# Patient Record
Sex: MALE | Race: WHITE | Employment: OTHER | URBAN - METROPOLITAN AREA
[De-identification: names, ages, dates, MRNs, and addresses within clinical notes are randomized per-mention and may not be internally consistent; named-entity substitution may affect disease eponyms.]

---

## 2017-07-10 ENCOUNTER — OFFICE VISIT (OUTPATIENT)
Dept: FAMILY MEDICINE CLINIC | Age: 62
End: 2017-07-10

## 2017-07-10 VITALS
SYSTOLIC BLOOD PRESSURE: 144 MMHG | RESPIRATION RATE: 16 BRPM | BODY MASS INDEX: 27.55 KG/M2 | HEIGHT: 69 IN | TEMPERATURE: 97.5 F | WEIGHT: 186 LBS | DIASTOLIC BLOOD PRESSURE: 90 MMHG | HEART RATE: 62 BPM

## 2017-07-10 DIAGNOSIS — Z00.00 ANNUAL PHYSICAL EXAM: ICD-10-CM

## 2017-07-10 DIAGNOSIS — E11.9 TYPE 2 DIABETES MELLITUS WITHOUT COMPLICATION, UNSPECIFIED LONG TERM INSULIN USE STATUS: ICD-10-CM

## 2017-07-10 DIAGNOSIS — Z00.00 ANNUAL PHYSICAL EXAM: Primary | ICD-10-CM

## 2017-07-10 LAB
A/G RATIO: 1.5 (ref 1.1–2.2)
ALBUMIN SERPL-MCNC: 4.2 G/DL (ref 3.4–5)
ALP BLD-CCNC: 65 U/L (ref 40–129)
ALT SERPL-CCNC: 11 U/L (ref 10–40)
ANION GAP SERPL CALCULATED.3IONS-SCNC: 17 MMOL/L (ref 3–16)
AST SERPL-CCNC: 10 U/L (ref 15–37)
BASOPHILS ABSOLUTE: 0.1 K/UL (ref 0–0.2)
BASOPHILS RELATIVE PERCENT: 1 %
BILIRUB SERPL-MCNC: <0.2 MG/DL (ref 0–1)
BUN BLDV-MCNC: 22 MG/DL (ref 7–20)
CALCIUM SERPL-MCNC: 9 MG/DL (ref 8.3–10.6)
CHLORIDE BLD-SCNC: 103 MMOL/L (ref 99–110)
CHOLESTEROL, TOTAL: 136 MG/DL (ref 0–199)
CO2: 22 MMOL/L (ref 21–32)
CREAT SERPL-MCNC: 1 MG/DL (ref 0.8–1.3)
CREATININE URINE POCT: ABNORMAL
EOSINOPHILS ABSOLUTE: 0.6 K/UL (ref 0–0.6)
EOSINOPHILS RELATIVE PERCENT: 5.3 %
GFR AFRICAN AMERICAN: >60
GFR NON-AFRICAN AMERICAN: >60
GLOBULIN: 2.8 G/DL
GLUCOSE BLD-MCNC: 141 MG/DL (ref 70–99)
HBA1C MFR BLD: 6.6 %
HCT VFR BLD CALC: 46.1 % (ref 40.5–52.5)
HDLC SERPL-MCNC: 38 MG/DL (ref 40–60)
HEMOGLOBIN: 15.2 G/DL (ref 13.5–17.5)
LDL CHOLESTEROL CALCULATED: 77 MG/DL
LYMPHOCYTES ABSOLUTE: 1.6 K/UL (ref 1–5.1)
LYMPHOCYTES RELATIVE PERCENT: 14.9 %
MCH RBC QN AUTO: 30.1 PG (ref 26–34)
MCHC RBC AUTO-ENTMCNC: 33.1 G/DL (ref 31–36)
MCV RBC AUTO: 91 FL (ref 80–100)
MICROALBUMIN/CREAT 24H UR: ABNORMAL MG/G{CREAT}
MICROALBUMIN/CREAT UR-RTO: ABNORMAL
MONOCYTES ABSOLUTE: 1 K/UL (ref 0–1.3)
MONOCYTES RELATIVE PERCENT: 9.2 %
NEUTROPHILS ABSOLUTE: 7.3 K/UL (ref 1.7–7.7)
NEUTROPHILS RELATIVE PERCENT: 69.6 %
PDW BLD-RTO: 13.4 % (ref 12.4–15.4)
PLATELET # BLD: 183 K/UL (ref 135–450)
PLATELET SLIDE REVIEW: ADEQUATE
PMV BLD AUTO: 7.9 FL (ref 5–10.5)
POTASSIUM SERPL-SCNC: 3.6 MMOL/L (ref 3.5–5.1)
PROSTATE SPECIFIC ANTIGEN: 2.64 NG/ML (ref 0–4)
RBC # BLD: 5.07 M/UL (ref 4.2–5.9)
SODIUM BLD-SCNC: 142 MMOL/L (ref 136–145)
TOTAL PROTEIN: 7 G/DL (ref 6.4–8.2)
TRIGL SERPL-MCNC: 106 MG/DL (ref 0–150)
URIC ACID, SERUM: 6 MG/DL (ref 3.5–7.2)
VLDLC SERPL CALC-MCNC: 21 MG/DL
WBC # BLD: 10.5 K/UL (ref 4–11)

## 2017-07-10 PROCEDURE — 99396 PREV VISIT EST AGE 40-64: CPT | Performed by: FAMILY MEDICINE

## 2017-07-10 PROCEDURE — 83036 HEMOGLOBIN GLYCOSYLATED A1C: CPT | Performed by: FAMILY MEDICINE

## 2017-07-10 PROCEDURE — 82044 UR ALBUMIN SEMIQUANTITATIVE: CPT | Performed by: FAMILY MEDICINE

## 2017-07-10 ASSESSMENT — PATIENT HEALTH QUESTIONNAIRE - PHQ9
SUM OF ALL RESPONSES TO PHQ9 QUESTIONS 1 & 2: 0
1. LITTLE INTEREST OR PLEASURE IN DOING THINGS: 0
SUM OF ALL RESPONSES TO PHQ QUESTIONS 1-9: 0
2. FEELING DOWN, DEPRESSED OR HOPELESS: 0

## 2017-07-10 ASSESSMENT — ENCOUNTER SYMPTOMS
SORE THROAT: 0
SINUS PRESSURE: 0
TROUBLE SWALLOWING: 0
SHORTNESS OF BREATH: 0
WHEEZING: 0
PHOTOPHOBIA: 0
BACK PAIN: 0
BLOOD IN STOOL: 0
CONSTIPATION: 0
ABDOMINAL PAIN: 0
COUGH: 0
VISUAL CHANGE: 0
DIARRHEA: 0

## 2017-09-23 PROBLEM — R10.9 ABDOMINAL PAIN: Status: ACTIVE | Noted: 2017-09-23

## 2017-09-23 PROBLEM — K63.89 COLONIC MASS: Status: ACTIVE | Noted: 2017-09-23

## 2017-09-25 ENCOUNTER — TELEPHONE (OUTPATIENT)
Dept: FAMILY MEDICINE CLINIC | Age: 62
End: 2017-09-25

## 2017-09-26 PROBLEM — I48.0 PAROXYSMAL ATRIAL FIBRILLATION (HCC): Status: ACTIVE | Noted: 2017-09-26

## 2017-10-02 ENCOUNTER — TELEPHONE (OUTPATIENT)
Dept: FAMILY MEDICINE CLINIC | Age: 62
End: 2017-10-02

## 2017-10-02 NOTE — TELEPHONE ENCOUNTER
CALL NOTE:  Pt's wife called stating her  just got out of the hospital where he is being tx for cancer. The family noticed a thick white covering over his tongue, wife believes it is thrush.  Nystatin called

## 2017-10-06 RX ORDER — METOPROLOL TARTRATE 50 MG/1
50 TABLET, FILM COATED ORAL 2 TIMES DAILY
Qty: 60 TABLET | Refills: 3
Start: 2017-10-06 | End: 2018-01-29 | Stop reason: SDUPTHER

## 2017-10-06 RX ORDER — AMIODARONE HYDROCHLORIDE 200 MG/1
TABLET ORAL
Qty: 30 TABLET | Refills: 3
Start: 2017-10-06 | End: 2018-01-29 | Stop reason: SDUPTHER

## 2017-10-09 ENCOUNTER — OFFICE VISIT (OUTPATIENT)
Dept: FAMILY MEDICINE CLINIC | Age: 62
End: 2017-10-09

## 2017-10-09 VITALS
SYSTOLIC BLOOD PRESSURE: 120 MMHG | OXYGEN SATURATION: 96 % | DIASTOLIC BLOOD PRESSURE: 78 MMHG | BODY MASS INDEX: 25.94 KG/M2 | HEART RATE: 80 BPM | RESPIRATION RATE: 16 BRPM | TEMPERATURE: 98.9 F | WEIGHT: 180.8 LBS

## 2017-10-09 DIAGNOSIS — K63.89 COLONIC MASS: Primary | ICD-10-CM

## 2017-10-09 DIAGNOSIS — R17 JAUNDICE: ICD-10-CM

## 2017-10-09 DIAGNOSIS — R64 CACHECTIC (HCC): ICD-10-CM

## 2017-10-09 DIAGNOSIS — C78.7 METASTASES TO THE LIVER (HCC): ICD-10-CM

## 2017-10-09 PROCEDURE — 99214 OFFICE O/P EST MOD 30 MIN: CPT | Performed by: NURSE PRACTITIONER

## 2017-10-09 ASSESSMENT — ENCOUNTER SYMPTOMS
VOMITING: 1
NAUSEA: 0
HEARTBURN: 0
DIARRHEA: 1
ABDOMINAL PAIN: 1
ORTHOPNEA: 0
BLOOD IN STOOL: 0
CONSTIPATION: 0

## 2017-10-09 NOTE — PROGRESS NOTES
Take 1 tablet by mouth daily 30 tablet 5    simvastatin (ZOCOR) 20 MG tablet TAKE ONE TABLET BY MOUTH NIGHTLY 30 tablet 5    metFORMIN (GLUCOPHAGE) 500 MG tablet TAKE ONE TABLET BY MOUTH TWICE A DAY WITH MEALS 60 tablet 5     No current facility-administered medications on file prior to visit. Review of Systems   Cardiovascular: Positive for palpitations. Negative for chest pain and orthopnea. A. Fib on amiodarone xalrelto   Gastrointestinal: Positive for abdominal pain, diarrhea and vomiting. Negative for blood in stool, constipation, heartburn, melena and nausea. Genitourinary: Negative for flank pain, frequency and hematuria. Musculoskeletal: Positive for myalgias. Neurological: Positive for headaches. Endo/Heme/Allergies:        Diabetes on metformin, blood sugars high   Psychiatric/Behavioral: Positive for depression. Objective:     Physical Exam   Constitutional: He is oriented to person, place, and time. Patient jaundiced and cachectic   HENT:   Head: Normocephalic and atraumatic. Eyes:   Sclera jaundiced   Neck: Normal range of motion. Neck supple. No thyromegaly present. Cardiovascular: Normal rate, regular rhythm and normal heart sounds. Exam reveals no gallop and no friction rub. No murmur heard. Pulmonary/Chest: Effort normal and breath sounds normal. No respiratory distress. He has no wheezes. He has no rales. He exhibits no tenderness. Abdominal: Soft. Midline abdominal incision with staples, no signs of infection   Musculoskeletal: Normal range of motion. Lymphadenopathy:     He has no cervical adenopathy. Neurological: He is alert and oriented to person, place, and time. Skin: Skin is warm and dry. Capillary refill takes less than 2 seconds. Psychiatric: He has a normal mood and affect. His behavior is normal. Judgment and thought content normal.       Assessment:       ICD-10-CM ICD-9-CM    1. Colonic mass K63.9 569.89    2.  Jaundice R17 782.4 3. Cachectic ([facility] [address] R64 799.4    4.  Metastases to the liver [facility]) C78.7 197.7          Plan:     Follow-up appointment made with oncologist bone number and address given to patient's wife  Continue meds as ordered  Encouraged by mouth intake    Call for any problems

## 2017-10-10 ENCOUNTER — OFFICE VISIT (OUTPATIENT)
Dept: SURGERY | Age: 62
End: 2017-10-10

## 2017-10-10 VITALS
WEIGHT: 181.6 LBS | BODY MASS INDEX: 26 KG/M2 | DIASTOLIC BLOOD PRESSURE: 81 MMHG | HEIGHT: 70 IN | SYSTOLIC BLOOD PRESSURE: 130 MMHG | HEART RATE: 79 BPM

## 2017-10-10 DIAGNOSIS — Z09 POSTOP CHECK: ICD-10-CM

## 2017-10-10 PROCEDURE — 99024 POSTOP FOLLOW-UP VISIT: CPT | Performed by: SURGERY

## 2017-11-01 ENCOUNTER — TELEPHONE (OUTPATIENT)
Dept: CARDIOLOGY CLINIC | Age: 62
End: 2017-11-01

## 2017-11-02 ENCOUNTER — PAT TELEPHONE (OUTPATIENT)
Dept: PREADMISSION TESTING | Age: 62
End: 2017-11-02

## 2017-11-03 ENCOUNTER — HOSPITAL ENCOUNTER (OUTPATIENT)
Dept: SURGERY | Age: 62
Discharge: HOME | End: 2017-11-03
Attending: SURGERY | Admitting: SURGERY

## 2017-11-03 VITALS
OXYGEN SATURATION: 95 % | TEMPERATURE: 97.8 F | WEIGHT: 177 LBS | DIASTOLIC BLOOD PRESSURE: 93 MMHG | HEART RATE: 62 BPM | HEIGHT: 70 IN | BODY MASS INDEX: 25.34 KG/M2 | RESPIRATION RATE: 16 BRPM | SYSTOLIC BLOOD PRESSURE: 158 MMHG

## 2017-11-03 DIAGNOSIS — R52 PAIN: ICD-10-CM

## 2017-11-03 LAB
ANION GAP SERPL CALCULATED.3IONS-SCNC: 12 MMOL/L (ref 3–16)
BASOPHILS ABSOLUTE: 0.1 K/UL (ref 0–0.2)
BASOPHILS RELATIVE PERCENT: 0.9 %
BUN BLDV-MCNC: 16 MG/DL (ref 7–20)
CALCIUM SERPL-MCNC: 8.1 MG/DL (ref 8.3–10.6)
CHLORIDE BLD-SCNC: 103 MMOL/L (ref 99–110)
CO2: 25 MMOL/L (ref 21–32)
CREAT SERPL-MCNC: 1 MG/DL (ref 0.8–1.3)
EOSINOPHILS ABSOLUTE: 0.2 K/UL (ref 0–0.6)
EOSINOPHILS RELATIVE PERCENT: 2.5 %
GFR AFRICAN AMERICAN: >60
GFR NON-AFRICAN AMERICAN: >60
GLUCOSE BLD-MCNC: 122 MG/DL (ref 70–99)
GLUCOSE BLD-MCNC: 125 MG/DL (ref 70–99)
HCT VFR BLD CALC: 28.3 % (ref 40.5–52.5)
HEMOGLOBIN: 9.8 G/DL (ref 13.5–17.5)
LYMPHOCYTES ABSOLUTE: 1.3 K/UL (ref 1–5.1)
LYMPHOCYTES RELATIVE PERCENT: 13.3 %
MCH RBC QN AUTO: 28.5 PG (ref 26–34)
MCHC RBC AUTO-ENTMCNC: 34.8 G/DL (ref 31–36)
MCV RBC AUTO: 81.8 FL (ref 80–100)
MONOCYTES ABSOLUTE: 1.1 K/UL (ref 0–1.3)
MONOCYTES RELATIVE PERCENT: 10.7 %
NEUTROPHILS ABSOLUTE: 7.1 K/UL (ref 1.7–7.7)
NEUTROPHILS RELATIVE PERCENT: 72.6 %
PDW BLD-RTO: 16 % (ref 12.4–15.4)
PERFORMED ON: ABNORMAL
PLATELET # BLD: 337 K/UL (ref 135–450)
PMV BLD AUTO: 6.1 FL (ref 5–10.5)
POTASSIUM SERPL-SCNC: 3.7 MMOL/L (ref 3.5–5.1)
RBC # BLD: 3.46 M/UL (ref 4.2–5.9)
SODIUM BLD-SCNC: 140 MMOL/L (ref 136–145)
WBC # BLD: 9.8 K/UL (ref 4–11)

## 2017-11-03 RX ORDER — SODIUM CHLORIDE, SODIUM LACTATE, POTASSIUM CHLORIDE, CALCIUM CHLORIDE 600; 310; 30; 20 MG/100ML; MG/100ML; MG/100ML; MG/100ML
INJECTION, SOLUTION INTRAVENOUS CONTINUOUS
Status: DISCONTINUED | OUTPATIENT
Start: 2017-11-03 | End: 2017-11-04 | Stop reason: HOSPADM

## 2017-11-03 RX ORDER — DIPHENHYDRAMINE HYDROCHLORIDE 50 MG/ML
12.5 INJECTION INTRAMUSCULAR; INTRAVENOUS
Status: ACTIVE | OUTPATIENT
Start: 2017-11-03 | End: 2017-11-03

## 2017-11-03 RX ORDER — ONDANSETRON 2 MG/ML
4 INJECTION INTRAMUSCULAR; INTRAVENOUS
Status: ACTIVE | OUTPATIENT
Start: 2017-11-03 | End: 2017-11-03

## 2017-11-03 RX ORDER — OXYCODONE HYDROCHLORIDE AND ACETAMINOPHEN 5; 325 MG/1; MG/1
1 TABLET ORAL PRN
Status: ACTIVE | OUTPATIENT
Start: 2017-11-03 | End: 2017-11-03

## 2017-11-03 RX ORDER — MORPHINE SULFATE 2 MG/ML
2 INJECTION, SOLUTION INTRAMUSCULAR; INTRAVENOUS EVERY 5 MIN PRN
Status: DISCONTINUED | OUTPATIENT
Start: 2017-11-03 | End: 2017-11-04 | Stop reason: HOSPADM

## 2017-11-03 RX ORDER — LABETALOL HYDROCHLORIDE 5 MG/ML
5 INJECTION, SOLUTION INTRAVENOUS EVERY 10 MIN PRN
Status: DISCONTINUED | OUTPATIENT
Start: 2017-11-03 | End: 2017-11-04 | Stop reason: HOSPADM

## 2017-11-03 RX ORDER — OXYCODONE HYDROCHLORIDE AND ACETAMINOPHEN 5; 325 MG/1; MG/1
1-2 TABLET ORAL EVERY 4 HOURS PRN
Qty: 20 TABLET | Refills: 0 | Status: SHIPPED | OUTPATIENT
Start: 2017-11-03 | End: 2017-11-10

## 2017-11-03 RX ORDER — HYDRALAZINE HYDROCHLORIDE 20 MG/ML
5 INJECTION INTRAMUSCULAR; INTRAVENOUS EVERY 10 MIN PRN
Status: DISCONTINUED | OUTPATIENT
Start: 2017-11-03 | End: 2017-11-04 | Stop reason: HOSPADM

## 2017-11-03 RX ORDER — PROMETHAZINE HYDROCHLORIDE 25 MG/ML
6.25 INJECTION, SOLUTION INTRAMUSCULAR; INTRAVENOUS
Status: ACTIVE | OUTPATIENT
Start: 2017-11-03 | End: 2017-11-03

## 2017-11-03 RX ORDER — MORPHINE SULFATE 2 MG/ML
1 INJECTION, SOLUTION INTRAMUSCULAR; INTRAVENOUS EVERY 5 MIN PRN
Status: DISCONTINUED | OUTPATIENT
Start: 2017-11-03 | End: 2017-11-04 | Stop reason: HOSPADM

## 2017-11-03 RX ORDER — OXYCODONE HYDROCHLORIDE AND ACETAMINOPHEN 5; 325 MG/1; MG/1
2 TABLET ORAL PRN
Status: DISPENSED | OUTPATIENT
Start: 2017-11-03 | End: 2017-11-03

## 2017-11-03 RX ORDER — MEPERIDINE HYDROCHLORIDE 50 MG/ML
12.5 INJECTION INTRAMUSCULAR; INTRAVENOUS; SUBCUTANEOUS EVERY 5 MIN PRN
Status: DISCONTINUED | OUTPATIENT
Start: 2017-11-03 | End: 2017-11-04 | Stop reason: HOSPADM

## 2017-11-03 RX ADMIN — SODIUM CHLORIDE, SODIUM LACTATE, POTASSIUM CHLORIDE, CALCIUM CHLORIDE: 600; 310; 30; 20 INJECTION, SOLUTION INTRAVENOUS at 08:30

## 2017-11-03 ASSESSMENT — PAIN SCALES - GENERAL: PAINLEVEL_OUTOF10: 0

## 2017-11-03 ASSESSMENT — PAIN - FUNCTIONAL ASSESSMENT: PAIN_FUNCTIONAL_ASSESSMENT: 0-10

## 2017-11-03 NOTE — ANESTHESIA PRE-OP
Department of Anesthesiology  Preprocedure Note       Name:  Shravan Holstein   Age:  63 y.o.  :  1955                                          MRN:  [id number]         Date:  11/3/2017      Surgeon:    Procedure:    Medications prior to admission:   Prior to Admission medications    Medication Sig Start Date End Date Taking? Authorizing Provider   oxyCODONE-acetaminophen (PERCOCET) 5-325 MG per tablet Take 1-2 tablets by mouth every 4 hours as needed for Pain . Earliest Fill Date: 11/3/17 11/3/17 11/10/17 Yes Ester Rowan MD   amiodarone (CORDARONE) 200 MG tablet 2 time daily for 7 days (till 10/6/17) and then Daily there after 10/6/17  Yes Luz Marina Vides CNP   metoprolol tartrate (LOPRESSOR) 50 MG tablet Take 1 tablet by mouth 2 times daily 10/6/17  Yes Luz Marina Vides CNP   rivaroxaban (XARELTO) 20 MG TABS tablet Take 1 tablet by mouth daily 10/2/17  Yes Angelic Guardado CNP   simvastatin (ZOCOR) 20 MG tablet TAKE ONE TABLET BY MOUTH NIGHTLY 17  Yes Luz Elena Brownlee DO   metFORMIN (GLUCOPHAGE) 500 MG tablet TAKE ONE TABLET BY MOUTH TWICE A DAY WITH MEALS 17  Yes Luz Elena Brownlee DO       Current medications:    Current Outpatient Prescriptions   Medication Sig Dispense Refill    oxyCODONE-acetaminophen (PERCOCET) 5-325 MG per tablet Take 1-2 tablets by mouth every 4 hours as needed for Pain .  Earliest Fill Date: 11/3/17 20 tablet 0    amiodarone (CORDARONE) 200 MG tablet 2 time daily for 7 days (till 10/6/17) and then Daily there after 30 tablet 3    metoprolol tartrate (LOPRESSOR) 50 MG tablet Take 1 tablet by mouth 2 times daily 60 tablet 3    rivaroxaban (XARELTO) 20 MG TABS tablet Take 1 tablet by mouth daily 30 tablet 5    simvastatin (ZOCOR) 20 MG tablet TAKE ONE TABLET BY MOUTH NIGHTLY 30 tablet 5    metFORMIN (GLUCOPHAGE) 500 MG tablet TAKE ONE TABLET BY MOUTH TWICE A DAY WITH MEALS 60 tablet 5     Current Facility-Administered Medications   Medication Dose Route

## 2017-11-03 NOTE — PROGRESS NOTES
Pt A&OX3. Wife at bedside. Explained dc instructions to both. Both voiced understanding. Wife signed and received copy with rx.

## 2017-11-03 NOTE — PROGRESS NOTES
Pt returned to SDS from OR. Awake and talking. VSS. Jessica Strickland Continue to monitor. Report received from RN and CRNA.

## 2017-11-03 NOTE — H&P
I have reviewed the history and physical and examined the patient. I find no relevant changes. I have reviewed with the patient and/or family members, during the preoperative office visit the risks, benefits, and alternatives to the procedure.     RODERICK PlayPhone SMITH

## 2017-11-04 NOTE — OP NOTE
Fluoroscopy  confirmed the wire to be coursing down the vena cava past the heart. The needle was removed and the dilator and sheath passed over the  wire. The dilator and wire removed. The catheter tubing was inserted  through the sheath and the sheath cracked and removed. The catheter  easily aspirated blood and was flushed with heparinized saline. The  catheter was then positioned under fluoroscopic guidance, so that the  tip was in the distal superior vena cava just above the right atrium. The catheter was cut at 23 cm and attached to the port. The port was  placed into the pocket and secured to the chest wall with two 2-0  Prolene sutures. The port was accessed with a Randle needle and once  again easily aspirated blood was flushed with more heparinized saline. Final fluoroscopic imaging confirmed good position of the port and  catheter. The incision was closed with 3-0 Vicryl subcutaneous  sutures followed by a 4-0 Monocryl subcuticular stitch and Dermabond. The patient was then taken to recovery room in stable condition. Eliza Watson MD    D: 11/03/2017 16:51:06       T: 11/03/2017 16:54:57     [email or web address]  Job#: [id number]     Doc#: [id number]    CC:   Ulises Chatterjee DO

## 2017-11-09 ENCOUNTER — OFFICE VISIT (OUTPATIENT)
Dept: CARDIOLOGY CLINIC | Age: 62
End: 2017-11-09

## 2017-11-09 VITALS
BODY MASS INDEX: 24.91 KG/M2 | SYSTOLIC BLOOD PRESSURE: 152 MMHG | HEIGHT: 70 IN | HEART RATE: 64 BPM | DIASTOLIC BLOOD PRESSURE: 88 MMHG | WEIGHT: 174 LBS

## 2017-11-09 DIAGNOSIS — I10 ESSENTIAL HYPERTENSION: ICD-10-CM

## 2017-11-09 DIAGNOSIS — I48.0 PAROXYSMAL ATRIAL FIBRILLATION (HCC): Primary | ICD-10-CM

## 2017-11-09 PROCEDURE — 99213 OFFICE O/P EST LOW 20 MIN: CPT | Performed by: NURSE PRACTITIONER

## 2017-11-09 PROCEDURE — 93000 ELECTROCARDIOGRAM COMPLETE: CPT | Performed by: NURSE PRACTITIONER

## 2017-11-09 NOTE — PROGRESS NOTES
daily for 7 days (till 10/6/17) and then Daily there after 10/6/17  Yes Gabriel Winters CNP   metoprolol tartrate (LOPRESSOR) 50 MG tablet Take 1 tablet by mouth 2 times daily 10/6/17  Yes Gabriel Winters CNP   rivaroxaban (XARELTO) 20 MG TABS tablet Take 1 tablet by mouth daily 10/2/17  Yes Terrell Greene CNP   simvastatin (ZOCOR) 20 MG tablet TAKE ONE TABLET BY MOUTH NIGHTLY 8/4/17  Yes Abeba Lee DO   metFORMIN (GLUCOPHAGE) 500 MG tablet TAKE ONE TABLET BY MOUTH TWICE A DAY WITH MEALS 8/4/17  Yes Abeba Lee DO       Allergies:  Review of patient's allergies indicates no known allergies. Social History:   reports that he has never smoked. He has never used smokeless tobacco. He reports that he uses drugs, including Marijuana, about 1 time per week. He reports that he does not drink alcohol. Family History: family history includes Cancer in his sister; Diabetes in his father; High Blood Pressure in his father and mother; High Cholesterol in his father and mother. Review of Systems   Constitutional: Negative. HENT: Negative. Eyes: Negative. Respiratory: Negative. Cardiovascular: see HPI  Gastrointestinal: Negative. Genitourinary: Negative. Musculoskeletal: Negative. Skin: Negative. Neurological: Negative. Hematological: Negative. Psychiatric/Behavioral: Negative. PHYSICAL EXAM:    Physical Examination:    BP (!) 152/88   Pulse 64   Ht 5' 10\" (1.778 m)   Wt 174 lb (78.9 kg)   BMI 24.97 kg/m²      Constitutional and general appearance: alert, cooperative, no distress and appears stated age  HEENT: PERRL, no cervical lymphadenopathy. No masses palpable.  Normal oral mucosa  Respiratory:  · Normal excursion and expansion without use of accessory muscles  · Resp auscultation: Normal breath sounds without dullness or wheezing  Cardiovascular:  · The apical impulse is not displaced  · Heart tones are crisp and normal. Regular S1 and S2.  · Jugular venous

## 2017-11-09 NOTE — PATIENT INSTRUCTIONS
1. No changes today   2. Goal blood pressure is less than 140/90. Call if consistently over goal  3.  Follow up in 6 months

## 2017-11-13 RX ORDER — AMLODIPINE BESYLATE 5 MG/1
5 TABLET ORAL DAILY
Qty: 30 TABLET | Refills: 3 | Status: SHIPPED | OUTPATIENT
Start: 2017-11-13 | End: 2018-07-19 | Stop reason: SDUPTHER

## 2017-11-13 NOTE — TELEPHONE ENCOUNTER
Spoke with , advised Evelina Buddy is starting amlodipine 5 mg daily. If b/p not less than 140/90 in a week to call the office.

## 2017-11-13 NOTE — TELEPHONE ENCOUNTER
Jessica Later, pt's wife, called stating pt was seen at [facility] today and was informed to call our office per that physician in regards to pt's BP. Today pt's BP was 180/92. Jessica Later stated they were informed by the Chemo doctor that it has increased since pt's last ov with them on 11-9-17. Please advise and call rj at [phone].

## 2018-01-27 RX ORDER — AMIODARONE HYDROCHLORIDE 200 MG/1
TABLET ORAL
Qty: 30 TABLET | Refills: 2 | Status: CANCELLED | OUTPATIENT
Start: 2018-01-27

## 2018-01-29 RX ORDER — METOPROLOL TARTRATE 50 MG/1
TABLET, FILM COATED ORAL
Qty: 60 TABLET | Refills: 2 | Status: SHIPPED | OUTPATIENT
Start: 2018-01-29 | End: 2018-04-30 | Stop reason: SDUPTHER

## 2018-01-29 NOTE — TELEPHONE ENCOUNTER
Please call Dr. Liz Cheng office and passed this refill request along to him. He more appropriately should refill it.

## 2018-01-29 NOTE — TELEPHONE ENCOUNTER
Pt is needing refill on amiodarone. Pt has taken last dose today. Please send to :  [address], [address] [place] [address] [address] [phone] - F [phone].

## 2018-01-30 RX ORDER — AMIODARONE HYDROCHLORIDE 200 MG/1
200 TABLET ORAL DAILY
Qty: 30 TABLET | Refills: 1 | Status: SHIPPED | OUTPATIENT
Start: 2018-01-30 | End: 2018-06-18 | Stop reason: ALTCHOICE

## 2018-02-05 ENCOUNTER — TELEPHONE (OUTPATIENT)
Dept: CARDIOLOGY CLINIC | Age: 63
End: 2018-02-05

## 2018-02-05 DIAGNOSIS — I48.0 PAROXYSMAL ATRIAL FIBRILLATION (HCC): Primary | ICD-10-CM

## 2018-02-06 RX ORDER — AMIODARONE HYDROCHLORIDE 200 MG/1
200 TABLET ORAL DAILY
Qty: 30 TABLET | Refills: 3 | OUTPATIENT
Start: 2018-02-06

## 2018-02-09 ENCOUNTER — HOSPITAL ENCOUNTER (OUTPATIENT)
Dept: CT IMAGING | Age: 63
Discharge: HOME | End: 2018-02-09
Attending: INTERNAL MEDICINE | Admitting: INTERNAL MEDICINE

## 2018-02-09 DIAGNOSIS — C18.0 ADENOCARCINOMA OF CECUM (HCC): ICD-10-CM

## 2018-02-09 DIAGNOSIS — C18.0 MALIGNANT NEOPLASM OF CECUM (HCC): ICD-10-CM

## 2018-04-12 PROBLEM — Z09 POSTOP CHECK: Status: RESOLVED | Noted: 2017-10-10 | Resolved: 2018-04-12

## 2018-04-30 RX ORDER — METOPROLOL TARTRATE 50 MG/1
TABLET, FILM COATED ORAL
Qty: 60 TABLET | Refills: 2 | Status: SHIPPED | OUTPATIENT
Start: 2018-04-30 | End: 2018-05-02 | Stop reason: SDUPTHER

## 2018-04-30 RX ORDER — SIMVASTATIN 20 MG
TABLET ORAL
Qty: 30 TABLET | Refills: 2 | Status: SHIPPED | OUTPATIENT
Start: 2018-04-30 | End: 2018-05-02 | Stop reason: SDUPTHER

## 2018-05-02 RX ORDER — SIMVASTATIN 20 MG
TABLET ORAL
Qty: 30 TABLET | Refills: 1 | Status: SHIPPED | OUTPATIENT
Start: 2018-05-02 | End: 2018-07-02 | Stop reason: SDUPTHER

## 2018-05-02 RX ORDER — METOPROLOL TARTRATE 50 MG/1
TABLET, FILM COATED ORAL
Qty: 60 TABLET | Refills: 1 | Status: SHIPPED | OUTPATIENT
Start: 2018-05-02 | End: 2018-07-02 | Stop reason: SDUPTHER

## 2018-05-11 ENCOUNTER — HOSPITAL ENCOUNTER (OUTPATIENT)
Dept: CT IMAGING | Age: 63
Discharge: HOME | End: 2018-05-11
Attending: INTERNAL MEDICINE | Admitting: INTERNAL MEDICINE

## 2018-05-11 DIAGNOSIS — C18.0 CANCER OF CECUM (HCC): ICD-10-CM

## 2018-05-11 DIAGNOSIS — C18.0 MALIGNANT NEOPLASM OF CECUM (HCC): ICD-10-CM

## 2018-05-11 LAB
BUN BLDV-MCNC: 23 MG/DL (ref 7–20)
CREAT SERPL-MCNC: 1.4 MG/DL (ref 0.8–1.3)
GFR AFRICAN AMERICAN: >60
GFR NON-AFRICAN AMERICAN: 52

## 2018-06-18 ENCOUNTER — OFFICE VISIT (OUTPATIENT)
Dept: CARDIOLOGY CLINIC | Age: 63
End: 2018-06-18

## 2018-06-18 VITALS
DIASTOLIC BLOOD PRESSURE: 86 MMHG | WEIGHT: 182.2 LBS | BODY MASS INDEX: 26.08 KG/M2 | HEIGHT: 70 IN | OXYGEN SATURATION: 99 % | SYSTOLIC BLOOD PRESSURE: 139 MMHG | HEART RATE: 62 BPM

## 2018-06-18 DIAGNOSIS — I48.0 PAROXYSMAL ATRIAL FIBRILLATION (HCC): ICD-10-CM

## 2018-06-18 DIAGNOSIS — I10 ESSENTIAL HYPERTENSION: Primary | ICD-10-CM

## 2018-06-18 DIAGNOSIS — E78.2 MIXED HYPERLIPIDEMIA: ICD-10-CM

## 2018-06-18 PROCEDURE — 99214 OFFICE O/P EST MOD 30 MIN: CPT | Performed by: INTERNAL MEDICINE

## 2018-06-18 PROCEDURE — 93000 ELECTROCARDIOGRAM COMPLETE: CPT | Performed by: INTERNAL MEDICINE

## 2018-07-19 ENCOUNTER — OFFICE VISIT (OUTPATIENT)
Dept: FAMILY MEDICINE CLINIC | Age: 63
End: 2018-07-19

## 2018-07-19 VITALS
HEIGHT: 70 IN | WEIGHT: 186 LBS | HEART RATE: 66 BPM | BODY MASS INDEX: 26.63 KG/M2 | DIASTOLIC BLOOD PRESSURE: 90 MMHG | OXYGEN SATURATION: 97 % | SYSTOLIC BLOOD PRESSURE: 150 MMHG

## 2018-07-19 DIAGNOSIS — E78.2 MIXED HYPERLIPIDEMIA: ICD-10-CM

## 2018-07-19 DIAGNOSIS — C78.7 COLON CANCER METASTASIZED TO LIVER (HCC): ICD-10-CM

## 2018-07-19 DIAGNOSIS — E11.9 TYPE 2 DIABETES MELLITUS WITHOUT COMPLICATION, WITHOUT LONG-TERM CURRENT USE OF INSULIN (HCC): Primary | ICD-10-CM

## 2018-07-19 DIAGNOSIS — C18.9 COLON CANCER METASTASIZED TO LIVER (HCC): ICD-10-CM

## 2018-07-19 DIAGNOSIS — I10 ESSENTIAL HYPERTENSION: ICD-10-CM

## 2018-07-19 LAB
CREATININE URINE POCT: ABNORMAL
HBA1C MFR BLD: 6.8 %
MICROALBUMIN/CREAT 24H UR: ABNORMAL MG/G{CREAT}
MICROALBUMIN/CREAT UR-RTO: ABNORMAL

## 2018-07-19 PROCEDURE — 99214 OFFICE O/P EST MOD 30 MIN: CPT | Performed by: FAMILY MEDICINE

## 2018-07-19 PROCEDURE — 83036 HEMOGLOBIN GLYCOSYLATED A1C: CPT | Performed by: FAMILY MEDICINE

## 2018-07-19 PROCEDURE — 82044 UR ALBUMIN SEMIQUANTITATIVE: CPT | Performed by: FAMILY MEDICINE

## 2018-07-19 RX ORDER — METOPROLOL TARTRATE 50 MG/1
TABLET, FILM COATED ORAL
Qty: 60 TABLET | Refills: 5 | Status: SHIPPED | OUTPATIENT
Start: 2018-07-19 | End: 2019-02-01 | Stop reason: SDUPTHER

## 2018-07-19 RX ORDER — POTASSIUM CHLORIDE 1500 MG/1
40 TABLET, FILM COATED, EXTENDED RELEASE ORAL
COMMUNITY

## 2018-07-19 RX ORDER — AMLODIPINE BESYLATE 5 MG/1
5 TABLET ORAL DAILY
Qty: 30 TABLET | Refills: 5 | Status: SHIPPED | OUTPATIENT
Start: 2018-07-19 | End: 2019-11-25 | Stop reason: ALTCHOICE

## 2018-07-19 RX ORDER — SIMVASTATIN 20 MG
TABLET ORAL
Qty: 30 TABLET | Refills: 5 | Status: SHIPPED | OUTPATIENT
Start: 2018-07-19 | End: 2019-02-01 | Stop reason: SDUPTHER

## 2018-07-19 ASSESSMENT — PATIENT HEALTH QUESTIONNAIRE - PHQ9
1. LITTLE INTEREST OR PLEASURE IN DOING THINGS: 0
SUM OF ALL RESPONSES TO PHQ9 QUESTIONS 1 & 2: 0
2. FEELING DOWN, DEPRESSED OR HOPELESS: 0
SUM OF ALL RESPONSES TO PHQ QUESTIONS 1-9: 0

## 2018-07-19 ASSESSMENT — ENCOUNTER SYMPTOMS
DIARRHEA: 0
COUGH: 0
CONSTIPATION: 0
SHORTNESS OF BREATH: 0
WHEEZING: 0

## 2018-08-13 ENCOUNTER — HOSPITAL ENCOUNTER (OUTPATIENT)
Dept: CT IMAGING | Age: 63
Discharge: HOME | End: 2018-08-13
Payer: COMMERCIAL

## 2018-08-13 DIAGNOSIS — C18.0 PRIMARY CANCER OF CECUM (HCC): ICD-10-CM

## 2018-08-13 PROCEDURE — 6360000004 HC RX CONTRAST MEDICATION: Performed by: INTERNAL MEDICINE

## 2018-08-13 PROCEDURE — 74177 CT ABD & PELVIS W/CONTRAST: CPT

## 2018-08-13 PROCEDURE — 71260 CT THORAX DX C+: CPT

## 2018-08-13 RX ADMIN — IOPAMIDOL 75 ML: 755 INJECTION, SOLUTION INTRAVENOUS at 16:20

## 2018-08-13 RX ADMIN — IOHEXOL 50 ML: 240 INJECTION, SOLUTION INTRATHECAL; INTRAVASCULAR; INTRAVENOUS; ORAL at 16:20

## 2018-09-24 ENCOUNTER — OFFICE VISIT (OUTPATIENT)
Dept: CARDIOLOGY CLINIC | Age: 63
End: 2018-09-24
Payer: COMMERCIAL

## 2018-09-24 VITALS
WEIGHT: 180 LBS | DIASTOLIC BLOOD PRESSURE: 80 MMHG | OXYGEN SATURATION: 96 % | HEART RATE: 72 BPM | SYSTOLIC BLOOD PRESSURE: 134 MMHG | BODY MASS INDEX: 25.77 KG/M2 | HEIGHT: 70 IN

## 2018-09-24 DIAGNOSIS — I10 ESSENTIAL HYPERTENSION: ICD-10-CM

## 2018-09-24 DIAGNOSIS — I48.0 PAROXYSMAL ATRIAL FIBRILLATION (HCC): Primary | ICD-10-CM

## 2018-09-24 PROCEDURE — 93000 ELECTROCARDIOGRAM COMPLETE: CPT | Performed by: NURSE PRACTITIONER

## 2018-09-24 PROCEDURE — 99213 OFFICE O/P EST LOW 20 MIN: CPT | Performed by: NURSE PRACTITIONER

## 2018-09-24 RX ORDER — HYDROCHLOROTHIAZIDE 12.5 MG/1
12.5 CAPSULE, GELATIN COATED ORAL DAILY
COMMUNITY

## 2018-09-24 NOTE — PROGRESS NOTES
[address]   Electrophysiology  Note              Date:  September 24, 2018  Patient name: Bettye Oliveira  YOB: 1955    Primary Care physician: Darnell Gallardo DO    HISTORY OF PRESENT ILLNESS: The patient is a 66 y.o.  male with a past medical history of PAF, HTN, metastatic colon cancer, HLD, and DM. He was admitted in 9/2017 with a bowel obstruction. He had a right colectomy and was diagnosed with metastatic colon cancer. Echo on 9/28/2017 showed an EF of 55-60%. He had multiple asymptomatic paroxysms of atrial fibrillation with RVR during his hospitalization. He did not respond to IV Cardizem, IV digoxin, and IV metoprolol but converted to sinus with IV amiodarone. He was discharged on oral amiodarone but it was discontinued in 6/2018 due to liver mets. Today he is being seen for paroxysmal atrial fibrillation. His EKG shows sinus rhythm with a HR of 77. He continues to receive chemotherapy and describes it as maintenance at this point. He is feeling generally well and denies chest pain, palpitations, shortness of breath, and dizziness. Past Medical History:   has a past medical history of Atrial fibrillation ([address] CAD (coronary artery disease); Colon cancer ([facility] [address] Hyperlipidemia; Hypertension; PAF (paroxysmal atrial fibrillation) ([facility] [address] Type 2 diabetes mellitus without complication ([facility] [address] and Type II or unspecified type diabetes mellitus without mention of complication, not stated as uncontrolled. Past Surgical History:   has a past surgical history that includes hernia repair (Left, 08/25/15); hemicolectomy (09/24/2017); and Abdomen surgery (09/2017). Home Medications:    Prior to Admission medications    Medication Sig Start Date End Date Taking?  Authorizing Provider   hydrochlorothiazide (MICROZIDE) 12.5 MG capsule Take 12.5 mg by mouth daily   Yes Historical Provider, MD   potassium chloride (KLOR-CON M) 20 MEQ TBCR extended release tablet Take 20 mEq apical impulse is not displaced  · Heart tones are crisp and normal. Regular S1 and S2.  · Jugular venous pulsation Normal  · The carotid upstroke is normal in amplitude and contour without delay or bruit  · Peripheral pulses are symmetrical and full   Abdomen:  · No masses or tenderness  · Bowel sounds present  Extremities:  ·  No cyanosis or clubbing  ·  No lower extremity edema  ·  Skin: warm and dry  Neurological:  · Alert and oriented  · Moves all extremities well  · No abnormalities of mood, affect, memory, mentation, or behavior are noted    DATA:    ECG 9/24/2018:  NSR HR 77     Echo 9/27/2017:  Technical difficult study due to poor acoustic window.   Systolic function appears grossly normal with an estimated ejection fraction of 55-60 %.   Normal left ventricular diastolic filling pressure.   The aortic valve appears trileaflet but sclerotic. CARDIOLOGY LABS:   CBC: No results for input(s): WBC, HGB, HCT, PLT in the last 72 hours. BMP: No results for input(s): NA, K, CO2, BUN, CREATININE, LABGLOM, GLUCOSE in the last 72 hours. PT/INR: No results for input(s): PROTIME, INR in the last 72 hours. APTT:No results for input(s): APTT in the last 72 hours. FASTING LIPID PANEL:  Lab Results   Component Value Date    HDL 38 07/10/2017    LDLCALC 77 07/10/2017    TRIG 106 07/10/2017     LIVER PROFILE:No results for input(s): AST, ALT, ALB in the last 72 hours. Assessment:   1. Paroxysmal atrial fibrillation: asymptomatic, detected in hospital in 9/2017   -on Xarelto for [id number] score 2 (DM and HTN)   -amiodarone stopped due to liver mets  2. HTN: controlled  3. Metastatic colon cancer and bowel obstruction: s/p right colectomy in 9/2017   -followed by Dr. Susannah Alexander  4. DM  5. HLD: on Zocor    Plan:   1. Continue metoprolol, Xarelto, amlodipine, and HCTZ  2. CBC and BMP are monitored by Dr. Susannah Alexander  3.  Follow up in 6 months    Julian Hernandez, [address]  [phone]

## 2018-09-24 NOTE — LETTER
LIVER PROFILE:No results for input(s): AST, ALT, ALB in the last 72 hours. Assessment:   1. Paroxysmal atrial fibrillation: asymptomatic, detected in hospital in 9/2017   -on Xarelto for [id number] score 2 (DM and HTN)   -amiodarone stopped due to liver mets  2. HTN: controlled  3. Metastatic colon cancer and bowel obstruction: s/p right colectomy in 9/2017   -followed by Dr. Chloe Irwin  4. DM  5. HLD: on Zocor    Plan:   1. Continue metoprolol, Xarelto, amlodipine, and HCTZ  2. CBC and BMP are monitored by Dr. Chloe Irwin  3. Follow up in 6 months    Sera Ash, [address]  [phone]       If you have questions, please do not hesitate to call me. I look forward to following Gabriele Millard along with you.     Sincerely,        Sera Ash, ENDY - CNP

## 2018-09-25 NOTE — COMMUNICATION BODY
[facility]   Electrophysiology  Note              Date:  September 24, 2018  Patient name: Royal Parra  YOB: 1955    Primary Care physician: Daimr Soriano DO    HISTORY OF PRESENT ILLNESS: The patient is a 66 y.o.  male with a past medical history of PAF, HTN, metastatic colon cancer, HLD, and DM. He was admitted in 9/2017 with a bowel obstruction. He had a right colectomy and was diagnosed with metastatic colon cancer. Echo on 9/28/2017 showed an EF of 55-60%. He had multiple asymptomatic paroxysms of atrial fibrillation with RVR during his hospitalization. He did not respond to IV Cardizem, IV digoxin, and IV metoprolol but converted to sinus with IV amiodarone. He was discharged on oral amiodarone but it was discontinued in 6/2018 due to liver mets. Today he is being seen for paroxysmal atrial fibrillation. His EKG shows sinus rhythm with a HR of 77. He continues to receive chemotherapy and describes it as maintenance at this point. He is feeling generally well and denies chest pain, palpitations, shortness of breath, and dizziness. Past Medical History:   has a past medical history of Atrial fibrillation ([facility] [address] CAD (coronary artery disease); Colon cancer ([facility] [address] Hyperlipidemia; Hypertension; PAF (paroxysmal atrial fibrillation) ([facility] [address] Type 2 diabetes mellitus without complication ([facility] [address] and Type II or unspecified type diabetes mellitus without mention of complication, not stated as uncontrolled. Past Surgical History:   has a past surgical history that includes hernia repair (Left, 08/25/15); hemicolectomy (09/24/2017); and Abdomen surgery (09/2017). Home Medications:    Prior to Admission medications    Medication Sig Start Date End Date Taking?  Authorizing Provider   hydrochlorothiazide (MICROZIDE) 12.5 MG capsule Take 12.5 mg by mouth daily   Yes Historical Provider, MD   potassium chloride (KLOR-CON M) 20 MEQ TBCR extended release tablet Take 20 mEq

## 2018-11-15 ENCOUNTER — HOSPITAL ENCOUNTER (OUTPATIENT)
Age: 63
Discharge: HOME | End: 2018-11-15
Payer: COMMERCIAL

## 2018-11-15 ENCOUNTER — HOSPITAL ENCOUNTER (OUTPATIENT)
Dept: CT IMAGING | Age: 63
Discharge: HOME | End: 2018-11-15
Payer: COMMERCIAL

## 2018-11-15 DIAGNOSIS — C18.0 ADENOCARCINOMA OF CECUM (HCC): ICD-10-CM

## 2018-11-15 LAB
A/G RATIO: 1.2 (ref 1.1–2.2)
ALBUMIN SERPL-MCNC: 3.8 G/DL (ref 3.4–5)
ALP BLD-CCNC: 57 U/L (ref 40–129)
ALT SERPL-CCNC: 12 U/L (ref 10–40)
ANION GAP SERPL CALCULATED.3IONS-SCNC: 9 MMOL/L (ref 3–16)
AST SERPL-CCNC: 14 U/L (ref 15–37)
BILIRUB SERPL-MCNC: 0.3 MG/DL (ref 0–1)
BUN BLDV-MCNC: 22 MG/DL (ref 7–20)
CALCIUM SERPL-MCNC: 9.4 MG/DL (ref 8.3–10.6)
CHLORIDE BLD-SCNC: 100 MMOL/L (ref 99–110)
CO2: 31 MMOL/L (ref 21–32)
CREAT SERPL-MCNC: 1.4 MG/DL (ref 0.8–1.3)
GFR AFRICAN AMERICAN: >60
GFR NON-AFRICAN AMERICAN: 51
GLOBULIN: 3.3 G/DL
GLUCOSE BLD-MCNC: 140 MG/DL (ref 70–99)
POTASSIUM SERPL-SCNC: 4.4 MMOL/L (ref 3.5–5.1)
SODIUM BLD-SCNC: 140 MMOL/L (ref 136–145)
TOTAL PROTEIN: 7.1 G/DL (ref 6.4–8.2)

## 2018-11-15 PROCEDURE — 36415 COLL VENOUS BLD VENIPUNCTURE: CPT

## 2018-11-15 PROCEDURE — 6360000004 HC RX CONTRAST MEDICATION: Performed by: INTERNAL MEDICINE

## 2018-11-15 PROCEDURE — 74177 CT ABD & PELVIS W/CONTRAST: CPT

## 2018-11-15 PROCEDURE — 71260 CT THORAX DX C+: CPT

## 2018-11-15 PROCEDURE — 80053 COMPREHEN METABOLIC PANEL: CPT

## 2018-11-15 RX ADMIN — IOPAMIDOL 100 ML: 755 INJECTION, SOLUTION INTRAVENOUS at 11:32

## 2018-11-15 RX ADMIN — IOHEXOL 50 ML: 240 INJECTION, SOLUTION INTRATHECAL; INTRAVASCULAR; INTRAVENOUS; ORAL at 11:32

## 2019-02-02 RX ORDER — SIMVASTATIN 20 MG
TABLET ORAL
Qty: 30 TABLET | Refills: 2 | Status: SHIPPED | OUTPATIENT
Start: 2019-02-02 | End: 2019-05-03 | Stop reason: SDUPTHER

## 2019-02-02 RX ORDER — METOPROLOL TARTRATE 50 MG/1
TABLET, FILM COATED ORAL
Qty: 60 TABLET | Refills: 2 | Status: SHIPPED | OUTPATIENT
Start: 2019-02-02 | End: 2019-05-04 | Stop reason: SDUPTHER

## 2019-02-18 ENCOUNTER — OFFICE VISIT (OUTPATIENT)
Dept: FAMILY MEDICINE CLINIC | Age: 64
End: 2019-02-18
Payer: COMMERCIAL

## 2019-02-18 VITALS
SYSTOLIC BLOOD PRESSURE: 146 MMHG | DIASTOLIC BLOOD PRESSURE: 92 MMHG | HEIGHT: 70 IN | BODY MASS INDEX: 26.63 KG/M2 | WEIGHT: 186 LBS | HEART RATE: 79 BPM | OXYGEN SATURATION: 98 %

## 2019-02-18 DIAGNOSIS — C78.7 COLON CANCER METASTASIZED TO LIVER (HCC): ICD-10-CM

## 2019-02-18 DIAGNOSIS — E11.9 TYPE 2 DIABETES MELLITUS WITHOUT COMPLICATION, WITHOUT LONG-TERM CURRENT USE OF INSULIN (HCC): Primary | ICD-10-CM

## 2019-02-18 DIAGNOSIS — I10 ESSENTIAL HYPERTENSION: ICD-10-CM

## 2019-02-18 DIAGNOSIS — E11.9 TYPE 2 DIABETES MELLITUS WITHOUT COMPLICATION, WITHOUT LONG-TERM CURRENT USE OF INSULIN (HCC): ICD-10-CM

## 2019-02-18 DIAGNOSIS — C18.9 COLON CANCER METASTASIZED TO LIVER (HCC): ICD-10-CM

## 2019-02-18 DIAGNOSIS — E78.2 MIXED HYPERLIPIDEMIA: ICD-10-CM

## 2019-02-18 LAB
A/G RATIO: 1.7 (ref 1.1–2.2)
ALBUMIN SERPL-MCNC: 4 G/DL (ref 3.4–5)
ALP BLD-CCNC: 56 U/L (ref 40–129)
ALT SERPL-CCNC: 9 U/L (ref 10–40)
ANION GAP SERPL CALCULATED.3IONS-SCNC: 15 MMOL/L (ref 3–16)
AST SERPL-CCNC: 11 U/L (ref 15–37)
BILIRUB SERPL-MCNC: 0.3 MG/DL (ref 0–1)
BUN BLDV-MCNC: 25 MG/DL (ref 7–20)
CALCIUM SERPL-MCNC: 8.9 MG/DL (ref 8.3–10.6)
CHLORIDE BLD-SCNC: 97 MMOL/L (ref 99–110)
CHOLESTEROL, TOTAL: 162 MG/DL (ref 0–199)
CO2: 25 MMOL/L (ref 21–32)
CREAT SERPL-MCNC: 1.2 MG/DL (ref 0.8–1.3)
GFR AFRICAN AMERICAN: >60
GFR NON-AFRICAN AMERICAN: >60
GLOBULIN: 2.3 G/DL
GLUCOSE BLD-MCNC: 163 MG/DL (ref 70–99)
HBA1C MFR BLD: 6.9 %
HDLC SERPL-MCNC: 45 MG/DL (ref 40–60)
LDL CHOLESTEROL CALCULATED: ABNORMAL MG/DL
LDL CHOLESTEROL DIRECT: 69 MG/DL
POTASSIUM SERPL-SCNC: 3.8 MMOL/L (ref 3.5–5.1)
SODIUM BLD-SCNC: 137 MMOL/L (ref 136–145)
TOTAL PROTEIN: 6.3 G/DL (ref 6.4–8.2)
TRIGL SERPL-MCNC: 309 MG/DL (ref 0–150)
VLDLC SERPL CALC-MCNC: ABNORMAL MG/DL

## 2019-02-18 PROCEDURE — 90471 IMMUNIZATION ADMIN: CPT | Performed by: FAMILY MEDICINE

## 2019-02-18 PROCEDURE — 83036 HEMOGLOBIN GLYCOSYLATED A1C: CPT | Performed by: FAMILY MEDICINE

## 2019-02-18 PROCEDURE — 90670 PCV13 VACCINE IM: CPT | Performed by: FAMILY MEDICINE

## 2019-02-18 PROCEDURE — 99214 OFFICE O/P EST MOD 30 MIN: CPT | Performed by: FAMILY MEDICINE

## 2019-02-18 ASSESSMENT — ENCOUNTER SYMPTOMS
CHOKING: 0
DIARRHEA: 1
COUGH: 0
ABDOMINAL PAIN: 0
BLOOD IN STOOL: 0
SHORTNESS OF BREATH: 0

## 2019-02-18 ASSESSMENT — PATIENT HEALTH QUESTIONNAIRE - PHQ9
2. FEELING DOWN, DEPRESSED OR HOPELESS: 0
1. LITTLE INTEREST OR PLEASURE IN DOING THINGS: 0
SUM OF ALL RESPONSES TO PHQ QUESTIONS 1-9: 0
SUM OF ALL RESPONSES TO PHQ QUESTIONS 1-9: 0
SUM OF ALL RESPONSES TO PHQ9 QUESTIONS 1 & 2: 0

## 2019-03-07 ENCOUNTER — HOSPITAL ENCOUNTER (OUTPATIENT)
Dept: CT IMAGING | Age: 64
Discharge: HOME | End: 2019-03-07
Payer: COMMERCIAL

## 2019-03-07 DIAGNOSIS — C18.0 CECAL CANCER (HCC): ICD-10-CM

## 2019-03-07 PROCEDURE — 74177 CT ABD & PELVIS W/CONTRAST: CPT

## 2019-03-07 PROCEDURE — 6360000004 HC RX CONTRAST MEDICATION: Performed by: INTERNAL MEDICINE

## 2019-03-07 RX ADMIN — IOHEXOL 50 ML: 240 INJECTION, SOLUTION INTRATHECAL; INTRAVASCULAR; INTRAVENOUS; ORAL at 10:03

## 2019-03-07 RX ADMIN — IOPAMIDOL 100 ML: 755 INJECTION, SOLUTION INTRAVENOUS at 10:03

## 2019-04-16 ENCOUNTER — HOSPITAL ENCOUNTER (OUTPATIENT)
Dept: CT IMAGING | Age: 64
Discharge: HOME | End: 2019-04-16
Payer: COMMERCIAL

## 2019-04-16 ENCOUNTER — HOSPITAL ENCOUNTER (OUTPATIENT)
Age: 64
Discharge: HOME | End: 2019-04-16
Payer: COMMERCIAL

## 2019-04-16 DIAGNOSIS — C18.0 MALIGNANT NEOPLASM OF CECUM (HCC): ICD-10-CM

## 2019-04-16 LAB
BUN BLDV-MCNC: 24 MG/DL (ref 7–20)
CREAT SERPL-MCNC: 1.5 MG/DL (ref 0.8–1.3)
GFR AFRICAN AMERICAN: 57
GFR NON-AFRICAN AMERICAN: 47

## 2019-04-16 PROCEDURE — 82565 ASSAY OF CREATININE: CPT

## 2019-04-16 PROCEDURE — 74177 CT ABD & PELVIS W/CONTRAST: CPT

## 2019-04-16 PROCEDURE — 6360000004 HC RX CONTRAST MEDICATION: Performed by: NURSE PRACTITIONER

## 2019-04-16 PROCEDURE — 36415 COLL VENOUS BLD VENIPUNCTURE: CPT

## 2019-04-16 PROCEDURE — 84520 ASSAY OF UREA NITROGEN: CPT

## 2019-04-16 RX ADMIN — IOHEXOL 50 ML: 240 INJECTION, SOLUTION INTRATHECAL; INTRAVASCULAR; INTRAVENOUS; ORAL at 15:30

## 2019-04-16 RX ADMIN — IOPAMIDOL 75 ML: 755 INJECTION, SOLUTION INTRAVENOUS at 15:30

## 2019-05-03 RX ORDER — SIMVASTATIN 20 MG
TABLET ORAL
Qty: 30 TABLET | Refills: 3 | Status: SHIPPED | OUTPATIENT
Start: 2019-05-03 | End: 2019-09-04 | Stop reason: SDUPTHER

## 2019-07-22 ENCOUNTER — HOSPITAL ENCOUNTER (OUTPATIENT)
Dept: CT IMAGING | Age: 64
Discharge: HOME | End: 2019-07-22
Payer: COMMERCIAL

## 2019-07-22 DIAGNOSIS — R10.84 GENERALIZED ABDOMINAL PAIN: ICD-10-CM

## 2019-07-22 DIAGNOSIS — C18.0 MALIGNANT NEOPLASM OF CECUM (HCC): ICD-10-CM

## 2019-07-22 PROCEDURE — 74177 CT ABD & PELVIS W/CONTRAST: CPT

## 2019-07-22 PROCEDURE — 6360000004 HC RX CONTRAST MEDICATION: Performed by: INTERNAL MEDICINE

## 2019-07-22 RX ADMIN — IOPAMIDOL 75 ML: 755 INJECTION, SOLUTION INTRAVENOUS at 16:09

## 2019-07-22 RX ADMIN — IOHEXOL 50 ML: 240 INJECTION, SOLUTION INTRATHECAL; INTRAVASCULAR; INTRAVENOUS; ORAL at 16:10

## 2019-08-05 ENCOUNTER — TELEPHONE (OUTPATIENT)
Dept: FAMILY MEDICINE CLINIC | Age: 64
End: 2019-08-05

## 2019-10-24 ENCOUNTER — HOSPITAL ENCOUNTER (OUTPATIENT)
Dept: CT IMAGING | Age: 64
Discharge: HOME | End: 2019-10-24
Payer: COMMERCIAL

## 2019-10-24 DIAGNOSIS — C18.0 MALIGNANT NEOPLASM OF CECUM (HCC): ICD-10-CM

## 2019-10-24 PROCEDURE — 74177 CT ABD & PELVIS W/CONTRAST: CPT

## 2019-10-24 PROCEDURE — 6360000004 HC RX CONTRAST MEDICATION: Performed by: INTERNAL MEDICINE

## 2019-10-24 RX ADMIN — IOPAMIDOL 100 ML: 755 INJECTION, SOLUTION INTRAVENOUS at 17:16

## 2019-10-24 RX ADMIN — IOHEXOL 50 ML: 240 INJECTION, SOLUTION INTRATHECAL; INTRAVASCULAR; INTRAVENOUS; ORAL at 17:16

## 2019-11-04 RX ORDER — METOPROLOL TARTRATE 50 MG/1
TABLET, FILM COATED ORAL
Qty: 60 TABLET | Refills: 4 | Status: SHIPPED | OUTPATIENT
Start: 2019-11-04 | End: 2020-01-01

## 2019-11-25 ENCOUNTER — OFFICE VISIT (OUTPATIENT)
Dept: FAMILY MEDICINE CLINIC | Age: 64
End: 2019-11-25
Payer: COMMERCIAL

## 2019-11-25 VITALS
HEIGHT: 70 IN | HEART RATE: 72 BPM | SYSTOLIC BLOOD PRESSURE: 160 MMHG | BODY MASS INDEX: 22.05 KG/M2 | DIASTOLIC BLOOD PRESSURE: 100 MMHG | OXYGEN SATURATION: 98 % | WEIGHT: 154 LBS

## 2019-11-25 DIAGNOSIS — C18.9 COLON CANCER METASTASIZED TO LIVER (HCC): ICD-10-CM

## 2019-11-25 DIAGNOSIS — E11.9 TYPE 2 DIABETES MELLITUS WITHOUT COMPLICATION, WITHOUT LONG-TERM CURRENT USE OF INSULIN (HCC): Primary | ICD-10-CM

## 2019-11-25 DIAGNOSIS — I10 ESSENTIAL HYPERTENSION: ICD-10-CM

## 2019-11-25 DIAGNOSIS — E78.2 MIXED HYPERLIPIDEMIA: ICD-10-CM

## 2019-11-25 DIAGNOSIS — C78.7 COLON CANCER METASTASIZED TO LIVER (HCC): ICD-10-CM

## 2019-11-25 LAB — HBA1C MFR BLD: 6.1 %

## 2019-11-25 PROCEDURE — G8484 FLU IMMUNIZE NO ADMIN: HCPCS | Performed by: FAMILY MEDICINE

## 2019-11-25 PROCEDURE — 99214 OFFICE O/P EST MOD 30 MIN: CPT | Performed by: FAMILY MEDICINE

## 2019-11-25 PROCEDURE — 1036F TOBACCO NON-USER: CPT | Performed by: FAMILY MEDICINE

## 2019-11-25 PROCEDURE — 3044F HG A1C LEVEL LT 7.0%: CPT | Performed by: FAMILY MEDICINE

## 2019-11-25 PROCEDURE — 83036 HEMOGLOBIN GLYCOSYLATED A1C: CPT | Performed by: FAMILY MEDICINE

## 2019-11-25 PROCEDURE — G8420 CALC BMI NORM PARAMETERS: HCPCS | Performed by: FAMILY MEDICINE

## 2019-11-25 PROCEDURE — G8427 DOCREV CUR MEDS BY ELIG CLIN: HCPCS | Performed by: FAMILY MEDICINE

## 2019-11-25 PROCEDURE — 2022F DILAT RTA XM EVC RTNOPTHY: CPT | Performed by: FAMILY MEDICINE

## 2019-11-25 PROCEDURE — 3017F COLORECTAL CA SCREEN DOC REV: CPT | Performed by: FAMILY MEDICINE

## 2019-11-25 ASSESSMENT — ENCOUNTER SYMPTOMS: COUGH: 0

## 2019-11-26 ASSESSMENT — ENCOUNTER SYMPTOMS
SHORTNESS OF BREATH: 0
WHEEZING: 0
SORE THROAT: 0
TROUBLE SWALLOWING: 0

## 2019-12-17 ENCOUNTER — HOSPITAL ENCOUNTER (OUTPATIENT)
Dept: CT IMAGING | Age: 64
Discharge: HOME | End: 2019-12-17
Payer: COMMERCIAL

## 2019-12-17 DIAGNOSIS — C18.0 MALIGNANT NEOPLASM OF CECUM (HCC): ICD-10-CM

## 2019-12-17 PROCEDURE — 6360000004 HC RX CONTRAST MEDICATION: Performed by: INTERNAL MEDICINE

## 2019-12-17 PROCEDURE — 74177 CT ABD & PELVIS W/CONTRAST: CPT

## 2019-12-17 RX ADMIN — IOHEXOL 50 ML: 240 INJECTION, SOLUTION INTRATHECAL; INTRAVASCULAR; INTRAVENOUS; ORAL at 14:33

## 2019-12-17 RX ADMIN — IOPAMIDOL 100 ML: 755 INJECTION, SOLUTION INTRAVENOUS at 14:34

## 2020-01-01 ENCOUNTER — HOSPITAL ENCOUNTER (OUTPATIENT)
Dept: CT IMAGING | Age: 65
Discharge: HOME | End: 2020-08-24
Payer: COMMERCIAL

## 2020-01-01 ENCOUNTER — HOSPITAL ENCOUNTER (OUTPATIENT)
Dept: CT IMAGING | Age: 65
Discharge: HOME | End: 2020-11-24
Payer: COMMERCIAL

## 2020-01-01 ENCOUNTER — HOSPITAL ENCOUNTER (OUTPATIENT)
Age: 65
Discharge: HOME | End: 2020-07-20
Payer: COMMERCIAL

## 2020-01-01 ENCOUNTER — HOSPITAL ENCOUNTER (OUTPATIENT)
Age: 65
Discharge: HOME | End: 2020-09-23
Payer: COMMERCIAL

## 2020-01-01 ENCOUNTER — HOSPITAL ENCOUNTER (OUTPATIENT)
Dept: CT IMAGING | Age: 65
Discharge: HOME | End: 2020-06-25
Payer: COMMERCIAL

## 2020-01-01 ENCOUNTER — HOSPITAL ENCOUNTER (OUTPATIENT)
Age: 65
Discharge: HOME | End: 2020-09-10
Payer: COMMERCIAL

## 2020-01-01 ENCOUNTER — HOSPITAL ENCOUNTER (OUTPATIENT)
Dept: CT IMAGING | Age: 65
Discharge: HOME | End: 2020-02-27
Payer: COMMERCIAL

## 2020-01-01 ENCOUNTER — HOSPITAL ENCOUNTER (OUTPATIENT)
Age: 65
Discharge: HOME | End: 2020-11-18
Payer: COMMERCIAL

## 2020-01-01 ENCOUNTER — HOSPITAL ENCOUNTER (OUTPATIENT)
Age: 65
Discharge: HOME | End: 2020-10-07
Payer: COMMERCIAL

## 2020-01-01 ENCOUNTER — HOSPITAL ENCOUNTER (OUTPATIENT)
Age: 65
Discharge: HOME | End: 2020-11-02
Payer: COMMERCIAL

## 2020-01-01 ENCOUNTER — HOSPITAL ENCOUNTER (OUTPATIENT)
Dept: NURSING | Age: 65
Setting detail: INFUSION SERIES
Discharge: HOME | End: 2020-10-22
Payer: COMMERCIAL

## 2020-01-01 ENCOUNTER — HOSPITAL ENCOUNTER (OUTPATIENT)
Dept: NURSING | Age: 65
Setting detail: INFUSION SERIES
Discharge: HOME | End: 2020-07-21
Payer: COMMERCIAL

## 2020-01-01 ENCOUNTER — HOSPITAL ENCOUNTER (OUTPATIENT)
Dept: CT IMAGING | Age: 65
Discharge: HOME | End: 2020-05-26
Payer: COMMERCIAL

## 2020-01-01 ENCOUNTER — HOSPITAL ENCOUNTER (OUTPATIENT)
Age: 65
Discharge: HOME | End: 2020-10-21
Payer: COMMERCIAL

## 2020-01-01 ENCOUNTER — HOSPITAL ENCOUNTER (OUTPATIENT)
Dept: NURSING | Age: 65
Setting detail: INFUSION SERIES
Discharge: HOME | End: 2020-11-03
Payer: COMMERCIAL

## 2020-01-01 ENCOUNTER — HOSPITAL ENCOUNTER (OUTPATIENT)
Dept: NURSING | Age: 65
Setting detail: INFUSION SERIES
Discharge: HOME | End: 2020-11-19
Payer: COMMERCIAL

## 2020-01-01 ENCOUNTER — HOSPITAL ENCOUNTER (OUTPATIENT)
Dept: NURSING | Age: 65
Setting detail: INFUSION SERIES
Discharge: HOME | End: 2020-10-08
Payer: COMMERCIAL

## 2020-01-01 ENCOUNTER — HOSPITAL ENCOUNTER (OUTPATIENT)
Dept: NURSING | Age: 65
Setting detail: INFUSION SERIES
Discharge: HOME | End: 2020-09-24
Payer: COMMERCIAL

## 2020-01-01 ENCOUNTER — HOSPITAL ENCOUNTER (OUTPATIENT)
Dept: NURSING | Age: 65
Setting detail: INFUSION SERIES
Discharge: HOME | End: 2020-09-01
Payer: COMMERCIAL

## 2020-01-01 ENCOUNTER — HOSPITAL ENCOUNTER (OUTPATIENT)
Dept: NURSING | Age: 65
Setting detail: INFUSION SERIES
Discharge: HOME | End: 2020-09-11
Payer: COMMERCIAL

## 2020-01-01 ENCOUNTER — HOSPITAL ENCOUNTER (OUTPATIENT)
Age: 65
Discharge: HOME | End: 2020-08-31
Payer: COMMERCIAL

## 2020-01-01 VITALS
WEIGHT: 159 LBS | DIASTOLIC BLOOD PRESSURE: 86 MMHG | TEMPERATURE: 97.9 F | BODY MASS INDEX: 22.76 KG/M2 | HEART RATE: 57 BPM | SYSTOLIC BLOOD PRESSURE: 164 MMHG | HEIGHT: 70 IN | RESPIRATION RATE: 18 BRPM

## 2020-01-01 VITALS
TEMPERATURE: 97.2 F | HEIGHT: 70 IN | BODY MASS INDEX: 21.47 KG/M2 | DIASTOLIC BLOOD PRESSURE: 93 MMHG | HEART RATE: 62 BPM | WEIGHT: 150 LBS | SYSTOLIC BLOOD PRESSURE: 175 MMHG | RESPIRATION RATE: 20 BRPM

## 2020-01-01 VITALS
HEART RATE: 60 BPM | TEMPERATURE: 97.5 F | SYSTOLIC BLOOD PRESSURE: 164 MMHG | RESPIRATION RATE: 20 BRPM | DIASTOLIC BLOOD PRESSURE: 82 MMHG

## 2020-01-01 VITALS
BODY MASS INDEX: 22.48 KG/M2 | HEART RATE: 71 BPM | SYSTOLIC BLOOD PRESSURE: 149 MMHG | RESPIRATION RATE: 20 BRPM | HEIGHT: 70 IN | WEIGHT: 157 LBS | TEMPERATURE: 97.4 F | DIASTOLIC BLOOD PRESSURE: 82 MMHG

## 2020-01-01 VITALS
HEART RATE: 62 BPM | DIASTOLIC BLOOD PRESSURE: 84 MMHG | RESPIRATION RATE: 20 BRPM | BODY MASS INDEX: 22.48 KG/M2 | TEMPERATURE: 97.1 F | SYSTOLIC BLOOD PRESSURE: 161 MMHG | WEIGHT: 157 LBS | HEIGHT: 70 IN

## 2020-01-01 VITALS
RESPIRATION RATE: 16 BRPM | WEIGHT: 162 LBS | HEART RATE: 62 BPM | BODY MASS INDEX: 23.19 KG/M2 | SYSTOLIC BLOOD PRESSURE: 170 MMHG | TEMPERATURE: 97.8 F | DIASTOLIC BLOOD PRESSURE: 95 MMHG | HEIGHT: 70 IN | OXYGEN SATURATION: 98 %

## 2020-01-01 VITALS
SYSTOLIC BLOOD PRESSURE: 183 MMHG | HEIGHT: 70 IN | WEIGHT: 150 LBS | TEMPERATURE: 97 F | BODY MASS INDEX: 21.47 KG/M2 | HEART RATE: 54 BPM | DIASTOLIC BLOOD PRESSURE: 90 MMHG | RESPIRATION RATE: 20 BRPM

## 2020-01-01 VITALS
HEART RATE: 62 BPM | TEMPERATURE: 97.6 F | HEIGHT: 70 IN | DIASTOLIC BLOOD PRESSURE: 87 MMHG | WEIGHT: 167 LBS | BODY MASS INDEX: 23.91 KG/M2 | SYSTOLIC BLOOD PRESSURE: 171 MMHG | RESPIRATION RATE: 20 BRPM

## 2020-01-01 LAB
ABO/RH: NORMAL
ANTIBODY SCREEN: NORMAL
BLOOD BANK DISPENSE STATUS: NORMAL
BLOOD BANK PRODUCT CODE: NORMAL
BPU ID: NORMAL
DESCRIPTION BLOOD BANK: NORMAL
GFR AFRICAN AMERICAN: 27
GFR NON-AFRICAN AMERICAN: 22
PERFORMED ON: ABNORMAL
POC CREATININE: 2.9 MG/DL (ref 0.8–1.3)
POC SAMPLE TYPE: ABNORMAL

## 2020-01-01 PROCEDURE — 74176 CT ABD & PELVIS W/O CONTRAST: CPT

## 2020-01-01 PROCEDURE — 6360000004 HC RX CONTRAST MEDICATION: Performed by: INTERNAL MEDICINE

## 2020-01-01 PROCEDURE — 86901 BLOOD TYPING SEROLOGIC RH(D): CPT

## 2020-01-01 PROCEDURE — 86923 COMPATIBILITY TEST ELECTRIC: CPT

## 2020-01-01 PROCEDURE — 36430 TRANSFUSION BLD/BLD COMPNT: CPT

## 2020-01-01 PROCEDURE — P9016 RBC LEUKOCYTES REDUCED: HCPCS

## 2020-01-01 PROCEDURE — 36415 COLL VENOUS BLD VENIPUNCTURE: CPT

## 2020-01-01 PROCEDURE — 82565 ASSAY OF CREATININE: CPT

## 2020-01-01 PROCEDURE — 86900 BLOOD TYPING SEROLOGIC ABO: CPT

## 2020-01-01 PROCEDURE — 99211 OFF/OP EST MAY X REQ PHY/QHP: CPT

## 2020-01-01 PROCEDURE — 6370000000 HC RX 637 (ALT 250 FOR IP): Performed by: INTERNAL MEDICINE

## 2020-01-01 PROCEDURE — 6370000000 HC RX 637 (ALT 250 FOR IP): Performed by: NURSE PRACTITIONER

## 2020-01-01 PROCEDURE — 86850 RBC ANTIBODY SCREEN: CPT

## 2020-01-01 PROCEDURE — 74150 CT ABDOMEN W/O CONTRAST: CPT

## 2020-01-01 PROCEDURE — 86920 COMPATIBILITY TEST SPIN: CPT

## 2020-01-01 PROCEDURE — 71250 CT THORAX DX C-: CPT

## 2020-01-01 PROCEDURE — 99201 HC NEW PT, OUTPT VISIT LEVEL 1: CPT

## 2020-01-01 PROCEDURE — 6360000004 HC RX CONTRAST MEDICATION: Performed by: NURSE PRACTITIONER

## 2020-01-01 RX ORDER — ACETAMINOPHEN 325 MG/1
650 TABLET ORAL ONCE
Status: COMPLETED | OUTPATIENT
Start: 2020-01-01 | End: 2020-01-01

## 2020-01-01 RX ORDER — PANTOPRAZOLE SODIUM 40 MG/1
40 GRANULE, DELAYED RELEASE ORAL
COMMUNITY

## 2020-01-01 RX ORDER — SODIUM CHLORIDE 0.9 % (FLUSH) 0.9 %
10 SYRINGE (ML) INJECTION EVERY 12 HOURS
Status: DISCONTINUED | OUTPATIENT
Start: 2020-01-01 | End: 2020-01-01 | Stop reason: HOSPADM

## 2020-01-01 RX ORDER — DIPHENHYDRAMINE HCL 25 MG
25 TABLET ORAL ONCE
Status: COMPLETED | OUTPATIENT
Start: 2020-01-01 | End: 2020-01-01

## 2020-01-01 RX ORDER — DIPHENHYDRAMINE HCL 25 MG
25 TABLET ORAL EVERY 6 HOURS PRN
Status: COMPLETED | OUTPATIENT
Start: 2020-01-01 | End: 2020-01-01

## 2020-01-01 RX ORDER — ACETAMINOPHEN 325 MG/1
650 TABLET ORAL EVERY 4 HOURS PRN
Status: DISCONTINUED | OUTPATIENT
Start: 2020-01-01 | End: 2020-01-01 | Stop reason: HOSPADM

## 2020-01-01 RX ORDER — SIMVASTATIN 20 MG
TABLET ORAL
Qty: 30 TABLET | Refills: 2 | Status: SHIPPED | OUTPATIENT
Start: 2020-01-01 | End: 2020-01-01

## 2020-01-01 RX ORDER — LANOLIN ALCOHOL/MO/W.PET/CERES
10 CREAM (GRAM) TOPICAL NIGHTLY PRN
COMMUNITY

## 2020-01-01 RX ORDER — M-VIT,TX,IRON,MINS/CALC/FOLIC 27MG-0.4MG
1 TABLET ORAL DAILY
COMMUNITY

## 2020-01-01 RX ORDER — METOPROLOL TARTRATE 50 MG/1
TABLET, FILM COATED ORAL
Qty: 60 TABLET | Refills: 3 | Status: SHIPPED | OUTPATIENT
Start: 2020-01-01 | End: 2020-01-01

## 2020-01-01 RX ORDER — DIPHENHYDRAMINE HCL 25 MG
25 TABLET ORAL EVERY 6 HOURS PRN
Status: DISCONTINUED | OUTPATIENT
Start: 2020-01-01 | End: 2020-01-01 | Stop reason: HOSPADM

## 2020-01-01 RX ORDER — METOPROLOL TARTRATE 50 MG/1
TABLET, FILM COATED ORAL
Qty: 60 TABLET | Refills: 2 | Status: ON HOLD
Start: 2020-01-01 | End: 2021-01-01 | Stop reason: HOSPADM

## 2020-01-01 RX ORDER — SIMVASTATIN 20 MG
TABLET ORAL
Qty: 30 TABLET | Refills: 1 | Status: SHIPPED | OUTPATIENT
Start: 2020-01-01 | End: 2020-01-01

## 2020-01-01 RX ADMIN — ACETAMINOPHEN 650 MG: 325 TABLET ORAL at 07:53

## 2020-01-01 RX ADMIN — DIPHENHYDRAMINE HCL 25 MG: 25 TABLET ORAL at 08:59

## 2020-01-01 RX ADMIN — ACETAMINOPHEN 650 MG: 325 TABLET ORAL at 08:01

## 2020-01-01 RX ADMIN — DIPHENHYDRAMINE HCL 25 MG: 25 TABLET ORAL at 07:45

## 2020-01-01 RX ADMIN — IOHEXOL 50 ML: 240 INJECTION, SOLUTION INTRATHECAL; INTRAVASCULAR; INTRAVENOUS; ORAL at 10:47

## 2020-01-01 RX ADMIN — DIPHENHYDRAMINE HCL 25 MG: 25 TABLET ORAL at 08:29

## 2020-01-01 RX ADMIN — IOHEXOL 50 ML: 240 INJECTION, SOLUTION INTRATHECAL; INTRAVASCULAR; INTRAVENOUS; ORAL at 13:26

## 2020-01-01 RX ADMIN — ACETAMINOPHEN 650 MG: 325 TABLET ORAL at 07:48

## 2020-01-01 RX ADMIN — ACETAMINOPHEN 650 MG: 325 TABLET ORAL at 07:45

## 2020-01-01 RX ADMIN — ACETAMINOPHEN 650 MG: 325 TABLET ORAL at 07:43

## 2020-01-01 RX ADMIN — IOHEXOL 50 ML: 240 INJECTION, SOLUTION INTRATHECAL; INTRAVASCULAR; INTRAVENOUS; ORAL at 10:15

## 2020-01-01 RX ADMIN — ACETAMINOPHEN 650 MG: 325 TABLET ORAL at 08:29

## 2020-01-01 RX ADMIN — DIPHENHYDRAMINE HCL 25 MG: 25 TABLET ORAL at 08:02

## 2020-01-01 RX ADMIN — IOHEXOL 50 ML: 240 INJECTION, SOLUTION INTRATHECAL; INTRAVASCULAR; INTRAVENOUS; ORAL at 11:25

## 2020-01-01 RX ADMIN — DIPHENHYDRAMINE HCL 25 MG: 25 TABLET ORAL at 08:41

## 2020-01-01 RX ADMIN — DIPHENHYDRAMINE HCL 25 MG: 25 TABLET ORAL at 07:48

## 2020-01-01 RX ADMIN — ACETAMINOPHEN 650 MG: 325 TABLET ORAL at 08:40

## 2020-01-01 RX ADMIN — ACETAMINOPHEN 650 MG: 325 TABLET ORAL at 08:59

## 2020-01-01 RX ADMIN — DIPHENHYDRAMINE HCL 25 MG: 25 TABLET ORAL at 07:54

## 2020-01-01 ASSESSMENT — PAIN SCALES - GENERAL
PAINLEVEL_OUTOF10: 0

## 2020-01-01 ASSESSMENT — PAIN - FUNCTIONAL ASSESSMENT
PAIN_FUNCTIONAL_ASSESSMENT: 0-10

## 2020-01-06 RX ORDER — SIMVASTATIN 20 MG
TABLET ORAL
Qty: 30 TABLET | Refills: 3 | Status: SHIPPED | OUTPATIENT
Start: 2020-01-06 | End: 2020-01-01

## 2020-08-31 NOTE — TELEPHONE ENCOUNTER
11/25/2019          Future Appointments   Date Time Provider Arturo Martinez   9/1/2020  8:00 AM [facility] CHAIR 2-A ARNALDO Esquivel

## 2020-09-11 NOTE — PROGRESS NOTES
Pt tolerated blood administration well without signs of reaction. Portacath needle flushed wih saline and removed. DSD placed at site. Discharged ambulatory in baseline condition.

## 2021-01-01 ENCOUNTER — APPOINTMENT (OUTPATIENT)
Dept: GENERAL RADIOLOGY | Age: 66
End: 2021-01-01
Payer: COMMERCIAL

## 2021-01-01 ENCOUNTER — HOSPITAL ENCOUNTER (OUTPATIENT)
Age: 66
Setting detail: OBSERVATION
Discharge: HOME | End: 2021-01-05
Attending: EMERGENCY MEDICINE | Admitting: INTERNAL MEDICINE
Payer: COMMERCIAL

## 2021-01-01 ENCOUNTER — APPOINTMENT (OUTPATIENT)
Dept: CT IMAGING | Age: 66
End: 2021-01-01
Payer: COMMERCIAL

## 2021-01-01 ENCOUNTER — TELEPHONE (OUTPATIENT)
Dept: FAMILY MEDICINE CLINIC | Age: 66
End: 2021-01-01

## 2021-01-01 ENCOUNTER — HOSPITAL ENCOUNTER (EMERGENCY)
Age: 66
Discharge: HOME | End: 2021-01-15
Payer: COMMERCIAL

## 2021-01-01 VITALS
HEART RATE: 86 BPM | SYSTOLIC BLOOD PRESSURE: 153 MMHG | HEIGHT: 70 IN | WEIGHT: 160 LBS | OXYGEN SATURATION: 97 % | TEMPERATURE: 97.6 F | DIASTOLIC BLOOD PRESSURE: 102 MMHG | BODY MASS INDEX: 22.9 KG/M2 | RESPIRATION RATE: 16 BRPM

## 2021-01-01 VITALS
SYSTOLIC BLOOD PRESSURE: 145 MMHG | HEART RATE: 86 BPM | OXYGEN SATURATION: 96 % | TEMPERATURE: 98.2 F | HEIGHT: 70 IN | WEIGHT: 220 LBS | DIASTOLIC BLOOD PRESSURE: 97 MMHG | RESPIRATION RATE: 18 BRPM | BODY MASS INDEX: 31.5 KG/M2

## 2021-01-01 DIAGNOSIS — C18.9 METASTATIC COLON CANCER TO LIVER (HCC): ICD-10-CM

## 2021-01-01 DIAGNOSIS — K83.8 INTRAHEPATIC BILE DUCT DILATION: Primary | ICD-10-CM

## 2021-01-01 DIAGNOSIS — E87.20 LACTIC ACIDOSIS: ICD-10-CM

## 2021-01-01 DIAGNOSIS — R34 DECREASED URINATION: ICD-10-CM

## 2021-01-01 DIAGNOSIS — R79.89 ELEVATED SERUM CREATININE: ICD-10-CM

## 2021-01-01 DIAGNOSIS — Z79.01 CHRONIC ANTICOAGULATION: ICD-10-CM

## 2021-01-01 DIAGNOSIS — C78.7 METASTATIC COLON CANCER TO LIVER (HCC): ICD-10-CM

## 2021-01-01 DIAGNOSIS — R17 JAUNDICE, NON-NEONATAL: ICD-10-CM

## 2021-01-01 DIAGNOSIS — D64.9 ANEMIA, UNSPECIFIED TYPE: Primary | ICD-10-CM

## 2021-01-01 DIAGNOSIS — D72.829 LEUKOCYTOSIS, UNSPECIFIED TYPE: ICD-10-CM

## 2021-01-01 DIAGNOSIS — R18.0 ASCITES, MALIGNANT: ICD-10-CM

## 2021-01-01 DIAGNOSIS — C78.00 COLON CANCER METASTASIZED TO LUNG (HCC): ICD-10-CM

## 2021-01-01 DIAGNOSIS — C18.9 COLON CANCER METASTASIZED TO LUNG (HCC): ICD-10-CM

## 2021-01-01 DIAGNOSIS — C18.9 MALIGNANT NEOPLASM OF COLON, UNSPECIFIED PART OF COLON (HCC): ICD-10-CM

## 2021-01-01 DIAGNOSIS — N18.9 CHRONIC KIDNEY DISEASE, UNSPECIFIED CKD STAGE: ICD-10-CM

## 2021-01-01 DIAGNOSIS — R74.01 TRANSAMINITIS: ICD-10-CM

## 2021-01-01 LAB
A/G RATIO: 0.5 (ref 1.1–2.2)
A/G RATIO: 0.5 (ref 1.1–2.2)
A/G RATIO: 0.6 (ref 1.1–2.2)
ABO/RH: NORMAL
ALBUMIN SERPL-MCNC: 1.8 G/DL (ref 3.4–5)
ALBUMIN SERPL-MCNC: 2 G/DL (ref 3.4–5)
ALBUMIN SERPL-MCNC: 2.2 G/DL (ref 3.4–5)
ALBUMIN SERPL-MCNC: 2.3 G/DL (ref 3.4–5)
ALP BLD-CCNC: 267 U/L (ref 40–129)
ALP BLD-CCNC: 268 U/L (ref 40–129)
ALP BLD-CCNC: 310 U/L (ref 40–129)
ALP BLD-CCNC: 433 U/L (ref 40–129)
ALT SERPL-CCNC: 37 U/L (ref 10–40)
ALT SERPL-CCNC: 41 U/L (ref 10–40)
ALT SERPL-CCNC: 42 U/L (ref 10–40)
ALT SERPL-CCNC: 46 U/L (ref 10–40)
AMMONIA: <10 UMOL/L (ref 16–60)
AMORPHOUS: ABNORMAL /HPF
ANION GAP SERPL CALCULATED.3IONS-SCNC: 10 MMOL/L (ref 3–16)
ANION GAP SERPL CALCULATED.3IONS-SCNC: 11 MMOL/L (ref 3–16)
ANION GAP SERPL CALCULATED.3IONS-SCNC: 12 MMOL/L (ref 3–16)
ANION GAP SERPL CALCULATED.3IONS-SCNC: 12 MMOL/L (ref 3–16)
ANTIBODY SCREEN: NORMAL
APTT: 38.7 SEC (ref 24.2–36.2)
AST SERPL-CCNC: 46 U/L (ref 15–37)
AST SERPL-CCNC: 53 U/L (ref 15–37)
AST SERPL-CCNC: 57 U/L (ref 15–37)
AST SERPL-CCNC: 57 U/L (ref 15–37)
BACTERIA: ABNORMAL /HPF
BACTERIA: ABNORMAL /HPF
BASOPHILS ABSOLUTE: 0 K/UL (ref 0–0.2)
BASOPHILS ABSOLUTE: 0.1 K/UL (ref 0–0.2)
BASOPHILS RELATIVE PERCENT: 0.2 %
BASOPHILS RELATIVE PERCENT: 0.2 %
BASOPHILS RELATIVE PERCENT: 0.3 %
BASOPHILS RELATIVE PERCENT: 0.4 %
BILIRUB SERPL-MCNC: 3 MG/DL (ref 0–1)
BILIRUB SERPL-MCNC: 3 MG/DL (ref 0–1)
BILIRUB SERPL-MCNC: 3.5 MG/DL (ref 0–1)
BILIRUB SERPL-MCNC: 5 MG/DL (ref 0–1)
BILIRUBIN DIRECT: 2.6 MG/DL (ref 0–0.3)
BILIRUBIN URINE: ABNORMAL
BILIRUBIN URINE: ABNORMAL
BILIRUBIN, INDIRECT: 0.9 MG/DL (ref 0–1)
BLOOD BANK DISPENSE STATUS: NORMAL
BLOOD BANK PRODUCT CODE: NORMAL
BLOOD, URINE: ABNORMAL
BLOOD, URINE: NEGATIVE
BPU ID: NORMAL
BUN BLDV-MCNC: 45 MG/DL (ref 7–20)
BUN BLDV-MCNC: 49 MG/DL (ref 7–20)
BUN BLDV-MCNC: 49 MG/DL (ref 7–20)
BUN BLDV-MCNC: 63 MG/DL (ref 7–20)
CALCIUM SERPL-MCNC: 7.9 MG/DL (ref 8.3–10.6)
CALCIUM SERPL-MCNC: 8.1 MG/DL (ref 8.3–10.6)
CALCIUM SERPL-MCNC: 8.1 MG/DL (ref 8.3–10.6)
CALCIUM SERPL-MCNC: 8.3 MG/DL (ref 8.3–10.6)
CHLORIDE BLD-SCNC: 100 MMOL/L (ref 99–110)
CHLORIDE BLD-SCNC: 101 MMOL/L (ref 99–110)
CHLORIDE BLD-SCNC: 102 MMOL/L (ref 99–110)
CHLORIDE BLD-SCNC: 103 MMOL/L (ref 99–110)
CLARITY: ABNORMAL
CLARITY: CLEAR
CO2: 19 MMOL/L (ref 21–32)
CO2: 20 MMOL/L (ref 21–32)
CO2: 22 MMOL/L (ref 21–32)
CO2: 23 MMOL/L (ref 21–32)
COARSE CASTS, UA: ABNORMAL /LPF (ref 0–2)
COARSE CASTS, UA: ABNORMAL /LPF (ref 0–2)
COLOR: YELLOW
COLOR: YELLOW
CREAT SERPL-MCNC: 2.2 MG/DL (ref 0.8–1.3)
CREAT SERPL-MCNC: 2.5 MG/DL (ref 0.8–1.3)
CREAT SERPL-MCNC: 2.5 MG/DL (ref 0.8–1.3)
CREAT SERPL-MCNC: 2.6 MG/DL (ref 0.8–1.3)
DESCRIPTION BLOOD BANK: NORMAL
EKG ATRIAL RATE: 78 BPM
EKG DIAGNOSIS: NORMAL
EKG P AXIS: 49 DEGREES
EKG P-R INTERVAL: 130 MS
EKG Q-T INTERVAL: 398 MS
EKG QRS DURATION: 88 MS
EKG QTC CALCULATION (BAZETT): 453 MS
EKG R AXIS: 52 DEGREES
EKG T AXIS: 77 DEGREES
EKG VENTRICULAR RATE: 78 BPM
EOSINOPHILS ABSOLUTE: 0.1 K/UL (ref 0–0.6)
EOSINOPHILS ABSOLUTE: 0.2 K/UL (ref 0–0.6)
EOSINOPHILS RELATIVE PERCENT: 0.8 %
EOSINOPHILS RELATIVE PERCENT: 0.8 %
EOSINOPHILS RELATIVE PERCENT: 1.4 %
EOSINOPHILS RELATIVE PERCENT: 1.4 %
EPITHELIAL CELLS, UA: ABNORMAL /HPF (ref 0–5)
EPITHELIAL CELLS, UA: ABNORMAL /HPF (ref 0–5)
FERRITIN: 586.1 NG/ML (ref 30–400)
FOLATE: 14.55 NG/ML (ref 4.78–24.2)
GFR AFRICAN AMERICAN: 30
GFR AFRICAN AMERICAN: 32
GFR AFRICAN AMERICAN: 32
GFR AFRICAN AMERICAN: 37
GFR NON-AFRICAN AMERICAN: 25
GFR NON-AFRICAN AMERICAN: 26
GFR NON-AFRICAN AMERICAN: 26
GFR NON-AFRICAN AMERICAN: 30
GLOBULIN: 3.9 G/DL
GLOBULIN: 4 G/DL
GLOBULIN: 4.4 G/DL
GLUCOSE BLD-MCNC: 133 MG/DL (ref 70–99)
GLUCOSE BLD-MCNC: 140 MG/DL (ref 70–99)
GLUCOSE BLD-MCNC: 148 MG/DL (ref 70–99)
GLUCOSE BLD-MCNC: 177 MG/DL (ref 70–99)
GLUCOSE URINE: NEGATIVE MG/DL
GLUCOSE URINE: NEGATIVE MG/DL
HAPTOGLOBIN: 319 MG/DL (ref 30–200)
HCT VFR BLD CALC: 22 % (ref 40.5–52.5)
HCT VFR BLD CALC: 24.6 % (ref 40.5–52.5)
HCT VFR BLD CALC: 29.2 % (ref 40.5–52.5)
HCT VFR BLD CALC: 30 % (ref 40.5–52.5)
HCT VFR BLD CALC: 31.2 % (ref 40.5–52.5)
HEMOGLOBIN: 6.9 G/DL (ref 13.5–17.5)
HEMOGLOBIN: 7.6 G/DL (ref 13.5–17.5)
HEMOGLOBIN: 9.2 G/DL (ref 13.5–17.5)
HEMOGLOBIN: 9.5 G/DL (ref 13.5–17.5)
HEMOGLOBIN: 9.6 G/DL (ref 13.5–17.5)
INR BLD: 3.39 (ref 0.86–1.14)
IRON SATURATION: 15 % (ref 20–50)
IRON: 16 UG/DL (ref 59–158)
KETONES, URINE: NEGATIVE MG/DL
KETONES, URINE: NEGATIVE MG/DL
LACTATE DEHYDROGENASE: 630 U/L (ref 100–190)
LACTIC ACID: 1.2 MMOL/L (ref 0.4–2)
LACTIC ACID: 1.3 MMOL/L (ref 0.4–2)
LACTIC ACID: 2.2 MMOL/L (ref 0.4–2)
LACTIC ACID: 2.9 MMOL/L (ref 0.4–2)
LEUKOCYTE ESTERASE, URINE: NEGATIVE
LEUKOCYTE ESTERASE, URINE: NEGATIVE
LIPASE: 11 U/L (ref 13–60)
LIPASE: 39 U/L (ref 13–60)
LYMPHOCYTES ABSOLUTE: 0.4 K/UL (ref 1–5.1)
LYMPHOCYTES ABSOLUTE: 0.5 K/UL (ref 1–5.1)
LYMPHOCYTES RELATIVE PERCENT: 2.4 %
LYMPHOCYTES RELATIVE PERCENT: 3.6 %
LYMPHOCYTES RELATIVE PERCENT: 3.9 %
LYMPHOCYTES RELATIVE PERCENT: 4.5 %
MAGNESIUM: 2 MG/DL (ref 1.8–2.4)
MCH RBC QN AUTO: 28 PG (ref 26–34)
MCH RBC QN AUTO: 28.5 PG (ref 26–34)
MCH RBC QN AUTO: 28.9 PG (ref 26–34)
MCH RBC QN AUTO: 29.2 PG (ref 26–34)
MCHC RBC AUTO-ENTMCNC: 30.6 G/DL (ref 31–36)
MCHC RBC AUTO-ENTMCNC: 31 G/DL (ref 31–36)
MCHC RBC AUTO-ENTMCNC: 31.5 G/DL (ref 31–36)
MCHC RBC AUTO-ENTMCNC: 31.6 G/DL (ref 31–36)
MCV RBC AUTO: 90.3 FL (ref 80–100)
MCV RBC AUTO: 91.2 FL (ref 80–100)
MCV RBC AUTO: 92.5 FL (ref 80–100)
MCV RBC AUTO: 93.2 FL (ref 80–100)
MICROSCOPIC EXAMINATION: YES
MICROSCOPIC EXAMINATION: YES
MONOCYTES ABSOLUTE: 1.1 K/UL (ref 0–1.3)
MONOCYTES ABSOLUTE: 1.3 K/UL (ref 0–1.3)
MONOCYTES ABSOLUTE: 1.5 K/UL (ref 0–1.3)
MONOCYTES ABSOLUTE: 1.6 K/UL (ref 0–1.3)
MONOCYTES RELATIVE PERCENT: 10.7 %
MONOCYTES RELATIVE PERCENT: 11.8 %
MONOCYTES RELATIVE PERCENT: 12.7 %
MONOCYTES RELATIVE PERCENT: 9 %
MUCUS: ABNORMAL /LPF
MUCUS: ABNORMAL /LPF
NEUTROPHILS ABSOLUTE: 10.2 K/UL (ref 1.7–7.7)
NEUTROPHILS ABSOLUTE: 10.5 K/UL (ref 1.7–7.7)
NEUTROPHILS ABSOLUTE: 15.3 K/UL (ref 1.7–7.7)
NEUTROPHILS ABSOLUTE: 6.8 K/UL (ref 1.7–7.7)
NEUTROPHILS RELATIVE PERCENT: 81.2 %
NEUTROPHILS RELATIVE PERCENT: 82.6 %
NEUTROPHILS RELATIVE PERCENT: 84.7 %
NEUTROPHILS RELATIVE PERCENT: 87.4 %
NITRITE, URINE: NEGATIVE
NITRITE, URINE: NEGATIVE
OCCULT BLOOD SCREENING: NORMAL
PDW BLD-RTO: 19.8 % (ref 12.4–15.4)
PDW BLD-RTO: 20.1 % (ref 12.4–15.4)
PDW BLD-RTO: 20.5 % (ref 12.4–15.4)
PDW BLD-RTO: 20.7 % (ref 12.4–15.4)
PH UA: 5 (ref 5–8)
PH UA: 5.5 (ref 5–8)
PLATELET # BLD: 116 K/UL (ref 135–450)
PLATELET # BLD: 142 K/UL (ref 135–450)
PLATELET # BLD: 173 K/UL (ref 135–450)
PLATELET # BLD: 241 K/UL (ref 135–450)
PMV BLD AUTO: 7.5 FL (ref 5–10.5)
PMV BLD AUTO: 7.5 FL (ref 5–10.5)
PMV BLD AUTO: 7.6 FL (ref 5–10.5)
PMV BLD AUTO: 7.7 FL (ref 5–10.5)
POTASSIUM REFLEX MAGNESIUM: 4.7 MMOL/L (ref 3.5–5.1)
POTASSIUM SERPL-SCNC: 3.8 MMOL/L (ref 3.5–5.1)
POTASSIUM SERPL-SCNC: 3.8 MMOL/L (ref 3.5–5.1)
POTASSIUM SERPL-SCNC: 4.7 MMOL/L (ref 3.5–5.1)
PRO-BNP: 5933 PG/ML (ref 0–124)
PROTEIN UA: 100 MG/DL
PROTEIN UA: 30 MG/DL
PROTHROMBIN TIME: 39.8 SEC (ref 10–13.2)
RBC # BLD: 2.38 M/UL (ref 4.2–5.9)
RBC # BLD: 2.64 M/UL (ref 4.2–5.9)
RBC # BLD: 3.33 M/UL (ref 4.2–5.9)
RBC # BLD: 3.42 M/UL (ref 4.2–5.9)
RBC UA: ABNORMAL /HPF (ref 0–4)
RBC UA: ABNORMAL /HPF (ref 0–4)
SODIUM BLD-SCNC: 131 MMOL/L (ref 136–145)
SODIUM BLD-SCNC: 133 MMOL/L (ref 136–145)
SODIUM BLD-SCNC: 135 MMOL/L (ref 136–145)
SODIUM BLD-SCNC: 136 MMOL/L (ref 136–145)
SOLUBLE TRANSFERRIN RECEPT: 9.2 MG/L (ref 2.2–5)
SPECIFIC GRAVITY UA: 1.02 (ref 1–1.03)
SPECIFIC GRAVITY UA: 1.02 (ref 1–1.03)
SPECIMEN STATUS: NORMAL
TOTAL IRON BINDING CAPACITY: 104 UG/DL (ref 260–445)
TOTAL PROTEIN: 5.7 G/DL (ref 6.4–8.2)
TOTAL PROTEIN: 6.1 G/DL (ref 6.4–8.2)
TOTAL PROTEIN: 6.2 G/DL (ref 6.4–8.2)
TOTAL PROTEIN: 6.7 G/DL (ref 6.4–8.2)
TROPONIN: <0.01 NG/ML
URINE REFLEX TO CULTURE: ABNORMAL
URINE TYPE: ABNORMAL
URINE TYPE: ABNORMAL
UROBILINOGEN, URINE: 1 E.U./DL
UROBILINOGEN, URINE: 2 E.U./DL
VITAMIN B-12: >2000 PG/ML (ref 211–911)
WBC # BLD: 12.1 K/UL (ref 4–11)
WBC # BLD: 12.7 K/UL (ref 4–11)
WBC # BLD: 17.5 K/UL (ref 4–11)
WBC # BLD: 8.4 K/UL (ref 4–11)
WBC UA: ABNORMAL /HPF (ref 0–5)
WBC UA: ABNORMAL /HPF (ref 0–5)

## 2021-01-01 PROCEDURE — 84238 ASSAY NONENDOCRINE RECEPTOR: CPT

## 2021-01-01 PROCEDURE — G0378 HOSPITAL OBSERVATION PER HR: HCPCS

## 2021-01-01 PROCEDURE — 83605 ASSAY OF LACTIC ACID: CPT

## 2021-01-01 PROCEDURE — 36415 COLL VENOUS BLD VENIPUNCTURE: CPT

## 2021-01-01 PROCEDURE — 83540 ASSAY OF IRON: CPT

## 2021-01-01 PROCEDURE — 97116 GAIT TRAINING THERAPY: CPT

## 2021-01-01 PROCEDURE — P9016 RBC LEUKOCYTES REDUCED: HCPCS

## 2021-01-01 PROCEDURE — 2580000003 HC RX 258: Performed by: INTERNAL MEDICINE

## 2021-01-01 PROCEDURE — 99285 EMERGENCY DEPT VISIT HI MDM: CPT

## 2021-01-01 PROCEDURE — 6370000000 HC RX 637 (ALT 250 FOR IP): Performed by: INTERNAL MEDICINE

## 2021-01-01 PROCEDURE — 80053 COMPREHEN METABOLIC PANEL: CPT

## 2021-01-01 PROCEDURE — 84484 ASSAY OF TROPONIN QUANT: CPT

## 2021-01-01 PROCEDURE — 85014 HEMATOCRIT: CPT

## 2021-01-01 PROCEDURE — 74176 CT ABD & PELVIS W/O CONTRAST: CPT

## 2021-01-01 PROCEDURE — 2580000003 HC RX 258: Performed by: EMERGENCY MEDICINE

## 2021-01-01 PROCEDURE — 83735 ASSAY OF MAGNESIUM: CPT

## 2021-01-01 PROCEDURE — 81001 URINALYSIS AUTO W/SCOPE: CPT

## 2021-01-01 PROCEDURE — 85610 PROTHROMBIN TIME: CPT

## 2021-01-01 PROCEDURE — 80076 HEPATIC FUNCTION PANEL: CPT

## 2021-01-01 PROCEDURE — 80048 BASIC METABOLIC PNL TOTAL CA: CPT

## 2021-01-01 PROCEDURE — 83010 ASSAY OF HAPTOGLOBIN QUANT: CPT

## 2021-01-01 PROCEDURE — 82728 ASSAY OF FERRITIN: CPT

## 2021-01-01 PROCEDURE — 2580000003 HC RX 258: Performed by: NURSE PRACTITIONER

## 2021-01-01 PROCEDURE — 85018 HEMOGLOBIN: CPT

## 2021-01-01 PROCEDURE — 86850 RBC ANTIBODY SCREEN: CPT

## 2021-01-01 PROCEDURE — 83690 ASSAY OF LIPASE: CPT

## 2021-01-01 PROCEDURE — 85025 COMPLETE CBC W/AUTO DIFF WBC: CPT

## 2021-01-01 PROCEDURE — 99283 EMERGENCY DEPT VISIT LOW MDM: CPT

## 2021-01-01 PROCEDURE — 86923 COMPATIBILITY TEST ELECTRIC: CPT

## 2021-01-01 PROCEDURE — 97166 OT EVAL MOD COMPLEX 45 MIN: CPT

## 2021-01-01 PROCEDURE — 97530 THERAPEUTIC ACTIVITIES: CPT

## 2021-01-01 PROCEDURE — 86900 BLOOD TYPING SEROLOGIC ABO: CPT

## 2021-01-01 PROCEDURE — 97162 PT EVAL MOD COMPLEX 30 MIN: CPT

## 2021-01-01 PROCEDURE — 86901 BLOOD TYPING SEROLOGIC RH(D): CPT

## 2021-01-01 PROCEDURE — 97535 SELF CARE MNGMENT TRAINING: CPT

## 2021-01-01 PROCEDURE — 82140 ASSAY OF AMMONIA: CPT

## 2021-01-01 PROCEDURE — 83880 ASSAY OF NATRIURETIC PEPTIDE: CPT

## 2021-01-01 PROCEDURE — 82746 ASSAY OF FOLIC ACID SERUM: CPT

## 2021-01-01 PROCEDURE — 96365 THER/PROPH/DIAG IV INF INIT: CPT

## 2021-01-01 PROCEDURE — 93010 ELECTROCARDIOGRAM REPORT: CPT | Performed by: INTERNAL MEDICINE

## 2021-01-01 PROCEDURE — 82607 VITAMIN B-12: CPT

## 2021-01-01 PROCEDURE — 83615 LACTATE (LD) (LDH) ENZYME: CPT

## 2021-01-01 PROCEDURE — 71045 X-RAY EXAM CHEST 1 VIEW: CPT

## 2021-01-01 PROCEDURE — 83550 IRON BINDING TEST: CPT

## 2021-01-01 PROCEDURE — 6360000002 HC RX W HCPCS: Performed by: NURSE PRACTITIONER

## 2021-01-01 PROCEDURE — G0328 FECAL BLOOD SCRN IMMUNOASSAY: HCPCS

## 2021-01-01 PROCEDURE — 93005 ELECTROCARDIOGRAM TRACING: CPT | Performed by: EMERGENCY MEDICINE

## 2021-01-01 PROCEDURE — 36430 TRANSFUSION BLD/BLD COMPNT: CPT

## 2021-01-01 PROCEDURE — 85730 THROMBOPLASTIN TIME PARTIAL: CPT

## 2021-01-01 RX ORDER — HYDRALAZINE HYDROCHLORIDE 20 MG/ML
10 INJECTION INTRAMUSCULAR; INTRAVENOUS EVERY 6 HOURS PRN
Status: DISCONTINUED | OUTPATIENT
Start: 2021-01-01 | End: 2021-01-01 | Stop reason: HOSPADM

## 2021-01-01 RX ORDER — PROMETHAZINE HYDROCHLORIDE 25 MG/1
12.5 TABLET ORAL EVERY 6 HOURS PRN
Status: DISCONTINUED | OUTPATIENT
Start: 2021-01-01 | End: 2021-01-01 | Stop reason: HOSPADM

## 2021-01-01 RX ORDER — METOPROLOL TARTRATE 50 MG/1
50 TABLET, FILM COATED ORAL 2 TIMES DAILY
Status: DISCONTINUED | OUTPATIENT
Start: 2021-01-01 | End: 2021-01-01

## 2021-01-01 RX ORDER — 0.9 % SODIUM CHLORIDE 0.9 %
250 INTRAVENOUS SOLUTION INTRAVENOUS ONCE
Status: DISCONTINUED | OUTPATIENT
Start: 2021-01-01 | End: 2021-01-01 | Stop reason: HOSPADM

## 2021-01-01 RX ORDER — METOPROLOL TARTRATE 100 MG/1
100 TABLET ORAL 2 TIMES DAILY
Qty: 60 TABLET | Refills: 3 | Status: SHIPPED | OUTPATIENT
Start: 2021-01-01

## 2021-01-01 RX ORDER — MECOBALAMIN 5000 MCG
10 TABLET,DISINTEGRATING ORAL NIGHTLY PRN
Status: DISCONTINUED | OUTPATIENT
Start: 2021-01-01 | End: 2021-01-01 | Stop reason: HOSPADM

## 2021-01-01 RX ORDER — 0.9 % SODIUM CHLORIDE 0.9 %
1000 INTRAVENOUS SOLUTION INTRAVENOUS ONCE
Status: COMPLETED | OUTPATIENT
Start: 2021-01-01 | End: 2021-01-01

## 2021-01-01 RX ORDER — SODIUM CHLORIDE 0.9 % (FLUSH) 0.9 %
10 SYRINGE (ML) INJECTION PRN
Status: DISCONTINUED | OUTPATIENT
Start: 2021-01-01 | End: 2021-01-01 | Stop reason: HOSPADM

## 2021-01-01 RX ORDER — SODIUM CHLORIDE 0.9 % (FLUSH) 0.9 %
10 SYRINGE (ML) INJECTION EVERY 12 HOURS SCHEDULED
Status: DISCONTINUED | OUTPATIENT
Start: 2021-01-01 | End: 2021-01-01 | Stop reason: HOSPADM

## 2021-01-01 RX ORDER — HYDROCHLOROTHIAZIDE 12.5 MG/1
12.5 CAPSULE, GELATIN COATED ORAL DAILY
Status: DISCONTINUED | OUTPATIENT
Start: 2021-01-01 | End: 2021-01-01 | Stop reason: HOSPADM

## 2021-01-01 RX ORDER — ACETAMINOPHEN 325 MG/1
650 TABLET ORAL EVERY 6 HOURS PRN
Status: DISCONTINUED | OUTPATIENT
Start: 2021-01-01 | End: 2021-01-01 | Stop reason: HOSPADM

## 2021-01-01 RX ORDER — SODIUM CHLORIDE 9 MG/ML
30 INJECTION, SOLUTION INTRAVENOUS ONCE
Status: COMPLETED | OUTPATIENT
Start: 2021-01-01 | End: 2021-01-01

## 2021-01-01 RX ORDER — ACETAMINOPHEN 650 MG/1
650 SUPPOSITORY RECTAL EVERY 6 HOURS PRN
Status: DISCONTINUED | OUTPATIENT
Start: 2021-01-01 | End: 2021-01-01 | Stop reason: HOSPADM

## 2021-01-01 RX ORDER — POLYETHYLENE GLYCOL 3350 17 G/17G
17 POWDER, FOR SOLUTION ORAL DAILY PRN
Status: DISCONTINUED | OUTPATIENT
Start: 2021-01-01 | End: 2021-01-01 | Stop reason: HOSPADM

## 2021-01-01 RX ORDER — METOPROLOL TARTRATE 50 MG/1
100 TABLET, FILM COATED ORAL 2 TIMES DAILY
Status: DISCONTINUED | OUTPATIENT
Start: 2021-01-01 | End: 2021-01-01 | Stop reason: HOSPADM

## 2021-01-01 RX ORDER — PANTOPRAZOLE SODIUM 40 MG/1
40 TABLET, DELAYED RELEASE ORAL
Status: DISCONTINUED | OUTPATIENT
Start: 2021-01-01 | End: 2021-01-01 | Stop reason: HOSPADM

## 2021-01-01 RX ORDER — ONDANSETRON 2 MG/ML
4 INJECTION INTRAMUSCULAR; INTRAVENOUS EVERY 6 HOURS PRN
Status: DISCONTINUED | OUTPATIENT
Start: 2021-01-01 | End: 2021-01-01 | Stop reason: HOSPADM

## 2021-01-01 RX ORDER — ATORVASTATIN CALCIUM 10 MG/1
10 TABLET, FILM COATED ORAL DAILY
Status: DISCONTINUED | OUTPATIENT
Start: 2021-01-01 | End: 2021-01-01

## 2021-01-01 RX ADMIN — ACETAMINOPHEN 650 MG: 325 TABLET ORAL at 21:39

## 2021-01-01 RX ADMIN — IRON SUCROSE 200 MG: 20 INJECTION, SOLUTION INTRAVENOUS at 13:55

## 2021-01-01 RX ADMIN — METOPROLOL TARTRATE 50 MG: 50 TABLET, FILM COATED ORAL at 11:17

## 2021-01-01 RX ADMIN — SODIUM CHLORIDE 2994 ML: 9 INJECTION, SOLUTION INTRAVENOUS at 19:23

## 2021-01-01 RX ADMIN — PANTOPRAZOLE SODIUM 40 MG: 40 TABLET, DELAYED RELEASE ORAL at 11:17

## 2021-01-01 RX ADMIN — ACETAMINOPHEN 650 MG: 325 TABLET ORAL at 14:28

## 2021-01-01 RX ADMIN — RIVAROXABAN 15 MG: 15 TABLET, FILM COATED ORAL at 09:09

## 2021-01-01 RX ADMIN — ACETAMINOPHEN 650 MG: 325 TABLET ORAL at 06:17

## 2021-01-01 RX ADMIN — PANTOPRAZOLE SODIUM 40 MG: 40 TABLET, DELAYED RELEASE ORAL at 06:17

## 2021-01-01 RX ADMIN — Medication 10 ML: at 09:10

## 2021-01-01 RX ADMIN — Medication 10 MG: at 21:39

## 2021-01-01 RX ADMIN — METOPROLOL TARTRATE 100 MG: 50 TABLET, FILM COATED ORAL at 09:09

## 2021-01-01 RX ADMIN — Medication 10 ML: at 21:39

## 2021-01-01 RX ADMIN — SODIUM CHLORIDE 1000 ML: 9 INJECTION, SOLUTION INTRAVENOUS at 02:13

## 2021-01-01 RX ADMIN — ATORVASTATIN CALCIUM 10 MG: 10 TABLET, FILM COATED ORAL at 11:17

## 2021-01-01 RX ADMIN — METOPROLOL TARTRATE 50 MG: 50 TABLET, FILM COATED ORAL at 21:39

## 2021-01-01 RX ADMIN — HYDROCHLOROTHIAZIDE 12.5 MG: 12.5 CAPSULE ORAL at 11:18

## 2021-01-01 RX ADMIN — HYDROCHLOROTHIAZIDE 12.5 MG: 12.5 CAPSULE ORAL at 09:09

## 2021-01-01 ASSESSMENT — ENCOUNTER SYMPTOMS
SHORTNESS OF BREATH: 0
VOMITING: 0
NAUSEA: 1
BACK PAIN: 0
DIARRHEA: 0
ABDOMINAL PAIN: 1

## 2021-01-01 ASSESSMENT — PAIN DESCRIPTION - LOCATION
LOCATION: BACK
LOCATION: BACK
LOCATION: GENERALIZED

## 2021-01-01 ASSESSMENT — PAIN DESCRIPTION - PAIN TYPE
TYPE: ACUTE PAIN
TYPE: CHRONIC PAIN
TYPE: CHRONIC PAIN

## 2021-01-01 ASSESSMENT — PAIN SCALES - GENERAL
PAINLEVEL_OUTOF10: 7
PAINLEVEL_OUTOF10: 3
PAINLEVEL_OUTOF10: 5
PAINLEVEL_OUTOF10: 8
PAINLEVEL_OUTOF10: 7
PAINLEVEL_OUTOF10: 5

## 2021-01-01 ASSESSMENT — PAIN DESCRIPTION - DESCRIPTORS: DESCRIPTORS: ACHING;DISCOMFORT

## 2021-01-01 ASSESSMENT — PAIN - FUNCTIONAL ASSESSMENT: PAIN_FUNCTIONAL_ASSESSMENT: PREVENTS OR INTERFERES SOME ACTIVE ACTIVITIES AND ADLS

## 2021-01-03 NOTE — LETTER
[facility] A1 Remote Telemetry  [address] [address]  Phone: [phone]             January 5, 2021    Patient: Yakov Petit   YOB: 1955   Date of Visit: 1/3/2021       To Whom It May Concern:    Rebecca Patel was seen and treated in our facility  beginning 1/3/2021 until 1/5/21.  His son Enid Powell was here to  patient and transport him back home from the hospital    Sincerely,       Bi Zuniga RN         Signature:__________________________________

## 2021-01-04 PROBLEM — C18.9 MALIGNANT NEOPLASM OF COLON (HCC): Status: ACTIVE | Noted: 2021-01-01

## 2021-01-04 PROBLEM — N18.9 CKD (CHRONIC KIDNEY DISEASE): Status: ACTIVE | Noted: 2021-01-01

## 2021-01-04 PROBLEM — D64.9 ANEMIA: Status: ACTIVE | Noted: 2021-01-01

## 2021-01-04 NOTE — PROGRESS NOTES
has a past medical history of Atrial fibrillation ([facility] [address] CAD (coronary artery disease), Colon cancer ([facility] [address] Hyperlipidemia, Hypertension, PAF (paroxysmal atrial fibrillation) ([facility] [address] Type 2 diabetes mellitus without complication ([facility] [address] and Type II or unspecified type diabetes mellitus without mention of complication, not stated as uncontrolled. has a past surgical history that includes hernia repair (Left, 08/25/15); hemicolectomy (09/24/2017); and Abdomen surgery (09/2017). Restrictions  Restrictions/Precautions  Restrictions/Precautions: General Precautions, Up as Tolerated  Vision/Hearing  Vision: Within Functional Limits  Hearing: Within functional limits     Subjective  General  Chart Reviewed: Yes  Patient assessed for rehabilitation services?: Yes  Additional Pertinent Hx: Low Hg and hx of CA. Response To Previous Treatment: Not applicable  Family / Caregiver Present: No  Referring Practitioner: Renetta Hardin MD  Referral Date : 01/04/21  Follows Commands: Within Functional Limits  General Comment  Comments: Pt resting in bed upon entry, RN cleared pt for therapy  Subjective  Subjective: Pt agreeable to therapy. Pt using urinal upon entering and agreed to wait a few minute prior to returning. Pain Screening  Patient Currently in Pain: Yes  Pain Assessment  Pain Assessment: 0-10  Pain Level: 5  Pain Type: Acute pain  Pain Location: Back;Buttocks  Non-Pharmaceutical Pain Intervention(s): Ambulation/Increased Activity;Repositioned; Therapeutic presence  Vital Signs  Patient Currently in Pain: Yes  Pre Treatment Pain Screening  Intervention List: Patient able to continue with treatment    Orientation  Orientation  Overall Orientation Status: Within Normal Limits  Social/Functional History  Social/Functional History  Lives With: Spouse  Type of Home: House  Home Layout: One level  Home Access: Stairs to enter without rails  Entrance Stairs - Number of Steps: 2 Bathroom Shower/Tub: Walk-in shower, Shower chair with back  Bathroom Toilet: Handicap height  ADL Assistance: Independent  Homemaking Responsibilities: Yes(prior to last couple of week, used to [employer] and do dishes)  Meal Prep Responsibility: Secondary  Laundry Responsibility: Secondary  Cleaning Responsibility: Secondary  Shopping Responsibility: Secondary  Ambulation Assistance: Independent  Transfer Assistance: Independent  Active : Yes  Occupation: On disability  Type of occupation: apartment mgmt         Objective          AROM RLE (degrees)  RLE AROM: WFL  AROM LLE (degrees)  LLE AROM : WFL  Strength RLE  Strength RLE: WFL  Comment: except hip flexion 3+/5  Strength LLE  Strength LLE: WFL  Comment: except hip flexion 3+/5     Sensation  Overall Sensation Status: (per pt report, numbness B feet anc calves)  Bed mobility  Supine to Sit: Stand by assistance  Sit to Supine: Stand by assistance  Scooting: Stand by assistance  Transfers  Sit to Stand: Contact guard assistance  Stand to sit: Contact guard assistance  Bed to Chair: Contact guard assistance(with RW)  Ambulation  Ambulation?: Yes  Ambulation 1  Surface: level tile  Device: Rolling Walker  Assistance: Contact guard assistance  Quality of Gait: decreased heel strike and toe off bilaterally, decreased knee flexion with swing through, \"stiff\" gait, mildly unsteady but no LOB  Distance: 30 ft     Balance  Posture: Fair  Sitting - Static: Good  Sitting - Dynamic: Good  Standing - Static: Fair;+  Standing - Dynamic: Fair;+  Exercises  Hip Flexion: x 5 B  Knee Long Arc Quad: x 10 B  Ankle Pumps: x 10 B     Plan   Plan  Times per week: 3-5  Current Treatment Recommendations: Strengthening, Neuromuscular Re-education, Home Exercise Program, ROM, Balance Training, Endurance Training, Functional Mobility Training, Transfer Training, Gait Training  Safety Devices Type of devices: All fall risk precautions in place, Left in chair, Call light within reach, Chair alarm in place, Nurse notified, Gait belt, Patient at risk for falls                                                             AM-PAC Score  AM-PAC Inpatient Mobility Raw Score : 19 (01/04/21 1654)  AM-PAC Inpatient T-Scale Score : 45.44 (01/04/21 1654)  Mobility Inpatient CMS 0-100% Score: 41.77 (01/04/21 1654)  Mobility Inpatient CMS G-Code Modifier : CK (01/04/21 1654)          Goals  Short term goals  Time Frame for Short term goals: 1/7/21 unless noted  Short term goal 1: Pt will perform bed mobility with supervision by 1/6/21  Short term goal 2: Pt will perform transfers with SBA  Short term goal 3: Pt will ambulate 75 ft with LRAD and SBA  Short term goal 4: Pt will perform standing march x 5 to assimilate stair negotiation with CGA  Patient Goals   Patient goals : \"to be able to walk when I go home\"       Therapy Time   Individual Concurrent Group Co-treatment   Time In 1611         Time Out 1630         Minutes 19         Timed Code Treatment Minutes: 9 Minutes(10 min eval)    If pt is discharged prior to next therapy session, this note will serve as discharge summary.     Becky Aguero, PT

## 2021-01-04 NOTE — PROGRESS NOTES
I was initially called by the ED physician Dr. Diandra Rojas for admission with a diagnosis of PAYTON . However chart review revealed that his creatinine was in similar range back in May 2019. So PAYTON would be an inaccurate diagnosis. I recommended that patient does not meet inpatient admission criteria. Patient received 1 L IV fluids in the ED. recheck hemoglobin revealed 6.9. Patient now being admitted to observation for blood transfusion. He is apparently on chemotherapy for metastatic colon cancer and follows up with [facility] Dr. Anel Springer.   Has received blood products in the past.

## 2021-01-04 NOTE — PROGRESS NOTES
Physical Therapy Attempt Note    Chart reviewed for the patient and attempted to see the pt on 1/4/21 at 1504 and 1508. Unable to see the pt at this time d/t pt using urinal upon entering room and requested to return. Upon attempting to return, pt still using urinal and requested to return at a later date. Will re-attempt as pt is available and as schedule allows. RN aware it may be 1/5/21 before returning to the pt.     Meliza Dunham, PT, DPT

## 2021-01-04 NOTE — H&P
Hospital Medicine History & Physical      PCP: Kelli Morris DO    Date of Admission: 1/3/2021    Date of Service: Pt seen/examined on 1/4/2021 and Admitted to Inpatient with expected LOS greater than two midnights due to medical therapy. Chief Complaint: Dysuria, generalized weakness      History Of Present Illness:     63 y.o. male with history of paroxysmal A. fib on anticoagulation with Xarelto, metastatic colon cancer, hypertension, hyperlipidemia, DM type II who presented to [facility] with dysuria and generalized weakness with fatigue for about one week. Hx  mainly obtained by patient and  EMR. Patient  has been having poor p.o. intake. Was having difficulty performing his ADLs due to generalized weakness and wife brought him to the ER to be evaluated. Denies any hematuria, fever, chills, sick contacts, cough, abdominal painhematochezia or melena. He was noted to have elevated lactic acid and leukocytosis in the ER. Stable CKD. UA negative for UTI. CT showed extensive metastatic disease to the liver and lungs which are chronic, diverticulosis with no evidence of diverticulitis. Initial hemoglobin on presentation was 7.6 which dropped to 6.9 after he had received IV fluids in the ER. Blood transfusion was ordered. He was admitted overnight for further care. Patient reports he feels better today after blood transfusion. States that he  gets blood transfusion intermittently by his oncologist, Dr. Darlene Manzano for his cancer. He is currently on daily chemo pills for his colon cancer which was recently changed by his oncologist but not sure the name of the medication.         Past Medical History:          Diagnosis Date    Atrial fibrillation ([address]     CAD (coronary artery disease)     Hx of A fib    Colon cancer ([address] 09/2017    stage 4     Hyperlipidemia     Hypertension     PAF (paroxysmal atrial fibrillation) (HCC)     Type 2 diabetes mellitus without complication ([address]  Type II or unspecified type diabetes mellitus without mention of complication, not stated as uncontrolled        Past Surgical History:          Procedure Laterality Date    ABDOMEN SURGERY  09/2017    Hemicolectomy    HEMICOLECTOMY  09/24/2017    RIGHT COLON MASS WITH SMALL BOWEL OBSTRUCTION              HERNIA REPAIR Left 08/25/15    open left inguinal hernia repair with mesh       Medications Prior to Admission:      Prior to Admission medications    Medication Sig Start Date End Date Taking? Authorizing Provider   rivaroxaban (XARELTO) 20 MG TABS tablet TAKE ONE TABLET BY MOUTH DAILY 12/30/20  Yes Guillermina Tuttle DO   metoprolol tartrate (LOPRESSOR) 50 MG tablet TAKE ONE TABLET BY MOUTH TWICE A DAY 12/28/20  Yes Guillermina Tuttle DO   simvastatin (ZOCOR) 20 MG tablet TAKE ONE TABLET BY MOUTH ONCE NIGHTLY 11/30/20  Yes Guillermina Tuttle DO   pantoprazole sodium (PROTONIX) 40 MG PACK packet Take 40 mg by mouth every morning (before breakfast)   Yes Historical Provider, MD   melatonin 3 MG TABS tablet Take 10 mg by mouth nightly as needed   Yes Historical Provider, MD   Multiple Vitamins-Minerals (THERAPEUTIC MULTIVITAMIN-MINERALS) tablet Take 1 tablet by mouth daily   Yes Historical Provider, MD   nystatin (MYCOSTATIN) 992034 UNIT/ML suspension Take 5 mLs by mouth 4 times daily 8/5/19  Yes Guillermina Tuttle DO   hydrochlorothiazide (MICROZIDE) 12.5 MG capsule Take 12.5 mg by mouth daily   Yes Historical Provider, MD   potassium chloride (KLOR-CON M) 20 MEQ TBCR extended release tablet Take 40 mEq by mouth daily (with breakfast)    Yes Historical Provider, MD   Ondansetron HCl (ZOFRAN PO) Take by mouth   Yes Historical Provider, MD       Allergies:  Patient has no known allergies. Social History:      The patient currently lives at home    TOBACCO:   reports that he has never smoked. He has never used smokeless tobacco.  ETOH:   reports no history of alcohol use.   E-Cigarettes/Vaping Use     Questions Responses TROPONINI <0.01       Urinalysis:      Lab Results   Component Value Date    NITRU Negative 01/03/2021    WBCUA 0-2 01/03/2021    BACTERIA 1+ 01/03/2021    RBCUA 3-4 01/03/2021    BLOODU MODERATE 01/03/2021    SPECGRAV 1.025 01/03/2021    GLUCOSEU Negative 01/03/2021       Radiology:       EKG:  I have reviewed the EKG with the following interpretation: Normal sinus rhythm at 78 bpm with nonspecific ST-T changes. CT ABDOMEN PELVIS WO CONTRAST   Final Result   Innumerable metastases in the lower lungs, stable to mildly progressed. Extensive metastatic disease to the liver is again noted. Small to moderate volume ascites. Probable mesenteric and omental metastatic implants. Redemonstrated postsurgical changes from partial right hemicolectomy. Left hemicolon diverticulosis without evidence of diverticulitis. Prostatomegaly. ASSESSMENT AND PLAN     Active Hospital Problems    Diagnosis Date Noted    Anemia [D64.9] 01/04/2021    CKD (chronic kidney disease) [N18.9] 01/04/2021    Malignant neoplasm of colon ([facility] [C18.9] 01/04/2021       Acute on chronic anemia: presented with hgb 7.6 - previous CBC's in chart in 2017, hgb in 9-12 however no recent values seen in care everywhere though pt says he has his labs checked by oncologist frequently but unclear his current baseline. No overt signs of GIB. Drop in hgb to 6.9 on repeat likely dilutional after getting IVF. S/p 1 PRBC. Repeat H& H post transfusion. Check iron studies, B12, folate, FOBT. Transfuse PRN for hgb <7    Metastatic colon cancer with mets to liver and lungs: s/p rt hemicolectomy. Follows with Dr Libra Yeager and reports he is on a daily chemo med- unclear which. Consult hemonc. Paroxysmal A fib: rate controlled. NSR on EKG. Continue BB and anticoagulation with xarelto ( reduced dose to 15mg due to CKD/ <GFR). Consider holding anticoagulation if any overt GIB noted. Transaminitis : likely due to liver mets. Hold statin for now     Hyperlipidemia: hold statin due to elevated LFT. HTN : controlled. continue home BP meds. Lactic acidosis : likely due to vol depletion. Not septic on exam.  Normalized after volume resuscitation with IV fluids. Leucocytosis: likely reactive. No overt signs of infection. UA neg for UTI. Resolved on repeat lab today. Monitor       Generalized weakness. Likely due to severe debilitation and anemia. S/p PRBC. PT/OT to eval       CKD stage III: stable. Monitor and avoid nephrotoxins    DVT Prophylaxis: xarelto  Diet: DIET GENERAL;  Code Status: Full Code    PT/OT Eval Status: ordered     Dispo - 1-3 days pending clinical course      Bettie Lopez MD    Thank you Luiza Espinoza DO for the opportunity to be involved in this patient's care. If you have any questions or concerns please feel free to contact me at [phone].

## 2021-01-04 NOTE — ED PROVIDER NOTES
EMERGENCY DEPARTMENT ENCOUNTER        Pt Name: Sofiya Shahid  MRN: [id number]  [place] 1955  Date of evaluation: 1/3/2021  Provider: Rene Elkins MD  PCP: Raul Handy DO      CHIEF COMPLAINT       Chief Complaint   Patient presents with    Dysuria     +weakness; hx colon cancer. wife wanted patient to come in. pt states that his dysuria is much better today. HISTORY OFPRESENT ILLNESS   (Location/Symptom, Timing/Onset, Context/Setting, Quality, Duration, Modifying Factors,Severity)  Note limiting factors. Sofiya Shahid is a 63 y.o. male presenting today due to concern for developing decreased urination over the past few days along with pain with urination and increasing generalized weakness. He also complains of lower abdominal pain. The symptoms have been going on for the last week. His discomfort actually improved today but his wife was concerned that he still was not feeling back to normal so she recommended he go to the emergency department for further evaluation. He denies any testicular pain. No chest pain or shortness of breath. He does have a history of metastatic colon cancer and is currently on chemotherapy for this. He also is on Xarelto. Due to concern for urinary complaints, he came to the emergency department for further evaluation. He has needed blood transfusions in the past.  He denies any blood in his stool. REVIEW OF SYSTEMS    (2-9 systems for level 4, 10 or more for level 5)     Review of Systems   Constitutional: Positive for fatigue. Negative for chills, diaphoresis and fever. HENT: Negative for congestion. Respiratory: Negative for shortness of breath. Cardiovascular: Negative for chest pain. Gastrointestinal: Positive for abdominal pain and nausea. Negative for diarrhea and vomiting. Genitourinary: Positive for difficulty urinating and dysuria. Negative for flank pain and testicular pain. Musculoskeletal: Negative for back pain and neck pain. Skin: Negative for wound. Neurological: Positive for weakness (generalized). Negative for syncope and headaches. Hematological: Bruises/bleeds easily. Psychiatric/Behavioral: Negative for confusion. Positives and Pertinent negatives as per HPI.       PASTMEDICAL HISTORY     Past Medical History:   Diagnosis Date    Atrial fibrillation ([facility]     CAD (coronary artery disease)     Hx of A fib    Colon cancer ([facility] 09/2017    stage 4     Hyperlipidemia     Hypertension     PAF (paroxysmal atrial fibrillation) ([facility]     Type 2 diabetes mellitus without complication ([facility]     Type II or unspecified type diabetes mellitus without mention of complication, not stated as uncontrolled          SURGICAL HISTORY       Past Surgical History:   Procedure Laterality Date    ABDOMEN SURGERY  09/2017    Hemicolectomy    HEMICOLECTOMY  09/24/2017    RIGHT COLON MASS WITH SMALL BOWEL OBSTRUCTION              HERNIA REPAIR Left 08/25/15    open left inguinal hernia repair with mesh         CURRENT MEDICATIONS       Current Discharge Medication List      CONTINUE these medications which have NOT CHANGED    Details   rivaroxaban (XARELTO) 20 MG TABS tablet TAKE ONE TABLET BY MOUTH DAILY  Qty: 30 tablet, Refills: 1      metoprolol tartrate (LOPRESSOR) 50 MG tablet TAKE ONE TABLET BY MOUTH TWICE A DAY  Qty: 60 tablet, Refills: 2      simvastatin (ZOCOR) 20 MG tablet TAKE ONE TABLET BY MOUTH ONCE NIGHTLY  Qty: 30 tablet, Refills: 1      pantoprazole sodium (PROTONIX) 40 MG PACK packet Take 40 mg by mouth every morning (before breakfast)      melatonin 3 MG TABS tablet Take 10 mg by mouth nightly as needed      Multiple Vitamins-Minerals (THERAPEUTIC MULTIVITAMIN-MINERALS) tablet Take 1 tablet by mouth daily      nystatin (MYCOSTATIN) 348745 UNIT/ML suspension Take 5 mLs by mouth 4 times daily  Qty: 120 mL, Refills: 1 hydrochlorothiazide (MICROZIDE) 12.5 MG capsule Take 12.5 mg by mouth daily      potassium chloride (KLOR-CON M) 20 MEQ TBCR extended release tablet Take 40 mEq by mouth daily (with breakfast)       Ondansetron HCl (ZOFRAN PO) Take by mouth             ALLERGIES     Patient has no known allergies.     FAMILY HISTORY       Family History   Problem Relation Age of Onset    Diabetes Father     High Blood Pressure Father     High Cholesterol Father     High Cholesterol Mother     High Blood Pressure Mother     Cancer Sister         site unknown          SOCIAL HISTORY       Social History     Socioeconomic History    Marital status:      Spouse name: Karlos Matthew Number of children: 2    Years of education: None    Highest education level: None   Occupational History    Occupation: maintenance     Employer: [employer]   Social Needs    Financial resource strain: None    Food insecurity     Worry: None     Inability: None    Transportation needs     Medical: None     Non-medical: None   Tobacco Use    Smoking status: Never Smoker    Smokeless tobacco: Never Used   Substance and Sexual Activity    Alcohol use: No     Alcohol/week: 0.0 standard drinks    Drug use: Yes     Frequency: 1.0 times per week     Types: Marijuana     Comment: 1-2x per week    Sexual activity: Yes     Partners: Female   Lifestyle    Physical activity     Days per week: None     Minutes per session: None    Stress: None   Relationships    Social connections     Talks on phone: None     Gets together: None     Attends Jain service: None     Active member of club or organization: None     Attends meetings of clubs or organizations: None     Relationship status: None    Intimate partner violence     Fear of current or ex partner: None     Emotionally abused: None     Physically abused: None     Forced sexual activity: None   Other Topics Concern    None   Social History Narrative    None       SCREENINGS Jairon Coma Scale  Eye Opening: Spontaneous  Best Verbal Response: Oriented  Best Motor Response: Obeys commands  Jairon Coma Scale Score: 15           PHYSICAL EXAM    (up to 7 for level 4, 8 or more for level 5)     ED Triage Vitals   BP Temp Temp Source Pulse Resp SpO2 Height Weight   01/03/21 2149 01/03/21 2149 01/03/21 2149 01/03/21 2141 01/03/21 2149 01/03/21 2141 01/03/21 2149 01/03/21 2149   (!) 189/89 98.6 °F (37 °C) Oral 92 16 94 % 5' 10\" (1.778 m) 160 lb (72.6 kg)       Physical Exam  Vitals signs reviewed. Constitutional:       General: He is awake. He is not in acute distress. Appearance: He is well-developed and normal weight. He is ill-appearing (chronically). He is not toxic-appearing or diaphoretic. Interventions: He is not intubated. HENT:      Head: Normocephalic and atraumatic. Right Ear: External ear normal.      Left Ear: External ear normal.      Nose: Nose normal.   Eyes:      General: Scleral icterus present. Right eye: No discharge. Left eye: No discharge. Pupils: Pupils are equal, round, and reactive to light. Neck:      Musculoskeletal: Full passive range of motion without pain, normal range of motion and neck supple. Normal range of motion. No edema, erythema or neck rigidity. Trachea: Trachea normal. No tracheal deviation. Cardiovascular:      Rate and Rhythm: Normal rate and regular rhythm. Pulses: Normal pulses. Radial pulses are 2+ on the right side and 2+ on the left side. Pulmonary:      Effort: Pulmonary effort is normal. No tachypnea, bradypnea, accessory muscle usage, prolonged expiration, respiratory distress or retractions. He is not intubated. Breath sounds: Normal breath sounds and air entry. No stridor, decreased air movement or transmitted upper airway sounds. No decreased breath sounds, wheezing, rhonchi or rales. Chest:      Chest wall: No tenderness.    Abdominal: General: Abdomen is flat. Bowel sounds are normal. There is no distension. Palpations: Abdomen is soft. Tenderness: There is abdominal tenderness (mild) in the right lower quadrant, suprapubic area and left lower quadrant. There is no guarding or rebound. Negative signs include Salinas's sign and McBurney's sign. Musculoskeletal: Normal range of motion. General: No tenderness or deformity. Right lower le+ Pitting Edema present. Left lower le+ Pitting Edema present. Skin:     General: Skin is warm and dry. Coloration: Skin is pale. Findings: No bruising, erythema or rash. Neurological:      General: No focal deficit present. Mental Status: He is alert and oriented to person, place, and time. Mental status is at baseline. GCS: GCS eye subscore is 4. GCS verbal subscore is 5. GCS motor subscore is 6. Sensory: No sensory deficit. Motor: Motor function is intact. No weakness, tremor, atrophy, abnormal muscle tone or seizure activity. Psychiatric:         Attention and Perception: Attention normal.         Mood and Affect: Mood and affect normal. Mood is not anxious. Speech: Speech normal.         Behavior: Behavior normal. Behavior is cooperative.              DIAGNOSTIC RESULTS   :    Labs Reviewed   CBC WITH AUTO DIFFERENTIAL - Abnormal; Notable for the following components:       Result Value    WBC 12.1 (*)     RBC 2.64 (*)     Hemoglobin 7.6 (*)     Hematocrit 24.6 (*)     RDW 20.7 (*)     Neutrophils Absolute 10.2 (*)     Lymphocytes Absolute 0.4 (*)     All other components within normal limits    Narrative:     Performed at:  [address]   Phone [phone]   COMPREHENSIVE METABOLIC PANEL W/ REFLEX TO MG FOR LOW K - Abnormal; Notable for the following components:    Glucose 177 (*)     BUN 49 (*)     CREATININE 2.6 (*)     GFR Non- 25 (*) GFR African American 30 (*)     Calcium 8.1 (*)     Total Protein 6.2 (*)     Alb 2.2 (*)     Albumin/Globulin Ratio 0.6 (*)     Total Bilirubin 3.0 (*)     Alkaline Phosphatase 310 (*)     ALT 42 (*)     AST 57 (*)     All other components within normal limits    Narrative:     Performed at:  [address], [facility] [employer]   Phone [phone]   URINE RT REFLEX TO CULTURE - Abnormal; Notable for the following components:    Bilirubin Urine SMALL (*)     Blood, Urine MODERATE (*)     Protein,  (*)     All other components within normal limits    Narrative:     Performed at:  [place] [address] [employer]   Phone [phone]   LACTIC ACID, PLASMA - Abnormal; Notable for the following components:    Lactic Acid 2.2 (*)     All other components within normal limits    Narrative:     Performed at:  [place] [address] [employer]   Phone [phone]   MICROSCOPIC URINALYSIS - Abnormal; Notable for the following components:    Coarse Casts, UA 3-5 (*)     Mucus, UA Rare (*)     Bacteria, UA 1+ (*)     All other components within normal limits    Narrative:     Performed at:  [address], [facility]   Phone [phone] - Abnormal; Notable for the following components:    Pro-BNP 5,933 (*)     All other components within normal limits    Narrative:     Performed at:  [address] [place] [address], [facility]   Phone [phone]   CBC WITH AUTO DIFFERENTIAL - Abnormal; Notable for the following components:    RBC 2.38 (*)     Hemoglobin 6.9 (*)     Hematocrit 22.0 (*)     RDW 20.5 (*)     Platelets 010 (*)     Lymphocytes Absolute 0.4 (*)     All other components within normal limits    Narrative: CALL  [place]  Lobito Mancuso [phone],  Hematology results called to and read back by Danyell Anthony rn, 01/04/2021  05:43, by Lee Palacios  Performed at:  [place] [address] [email or web address]   Phone [phone]   COMPREHENSIVE METABOLIC PANEL - Abnormal; Notable for the following components:    Sodium 135 (*)     Glucose 133 (*)     BUN 45 (*)     CREATININE 2.5 (*)     GFR Non- 26 (*)     GFR  32 (*)     Calcium 7.9 (*)     Total Protein 5.7 (*)     Alb 1.8 (*)     Albumin/Globulin Ratio 0.5 (*)     Total Bilirubin 3.0 (*)     Alkaline Phosphatase 268 (*)     AST 57 (*)     All other components within normal limits    Narrative:     Performed at:  [place] [address] [email or web address]   Phone [phone]   FERRITIN - Abnormal; Notable for the following components:    Ferritin 586.1 (*)     All other components within normal limits    Narrative:     Performed at:  [address] [email or web address] [address]   Phone [phone]   IRON AND TIBC - Abnormal; Notable for the following components:    Iron 16 (*)     TIBC 104 (*)     Iron Saturation 15 (*)     All other components within normal limits    Narrative:     Performed at:  [address] [email or web address] [address]   Phone [phone]   VITAMIN B12 & FOLATE - Abnormal; Notable for the following components:    Vitamin B-12 >2000 (*)     All other components within normal limits    Narrative:     Performed at:  [address] [email or web address] [address]   Phone [phone]   HEMOGLOBIN AND HEMATOCRIT, BLOOD - Abnormal; Notable for the following components:    Hemoglobin 9.2 (*)     Hematocrit 29.2 (*)     All other components within normal limits    Narrative:     Performed at: [address],  [place], [place] [employer] [place]   Phone [phone]   MAGNESIUM    Narrative:     Performed at:  [address],  [place], [place]   Phone [phone]   LIPASE    Narrative:     Performed at:  [address],  [place], [place] [employer] [place]   Phone [phone]   TROPONIN    Narrative:     Performed at:  [address],  [place], [place] [employer] [place]   Phone [phone]   LACTIC ACID, PLASMA    Narrative:     Performed at:  [address],  [place], [place] [employer] [place]   Phone [phone]   BLOOD OCCULT STOOL SCREEN #1    Narrative:     ORDER#: [id number]                          ORDERED BY: Tyra Garvin  SOURCE: Stool                              COLLECTED:  01/04/21 20:35  ANTIBIOTICS AT [facility].:                      RECEIVED :  01/04/21 22:08  Performed at:  [address],  [place], [place] [employer] [place]   Phone [phone]   CBC WITH AUTO DIFFERENTIAL   BASIC METABOLIC PANEL   TYPE AND SCREEN    Narrative:     Performed at:  [address],  [place] [employer]   Phone [phone]   PREPARE RBC (CROSSMATCH)       All other labs were within normal range or not returned asof this dictation. EKG: All EKG's are interpreted by the Emergency Department Physician who either signs or Co-signs this chart in the absence of a cardiologist.    The Ekg interpreted by me shows  normal sinus rhythm with a rate of 78  Axis is   Normal  QTc is  normal  Intervals and Durations are unremarkable.       ST Segments: no acute change and nonspecific changes  No significant change from prior EKG dated - 9/24/18  No STEMI           RADIOLOGY: Non-plain film images such as CT, Ultrasound and MRI are read by the radiologist. [place] images are visualized and preliminarily interpreted by the  ED Provider with the belowfindings:        Interpretation per the Radiologist below, if available at the time of this note:    CT ABDOMEN PELVIS WO CONTRAST   Final Result   Innumerable metastases in the lower lungs, stable to mildly progressed. Extensive metastatic disease to the liver is again noted. Small to moderate volume ascites. Probable mesenteric and omental metastatic implants. Redemonstrated postsurgical changes from partial right hemicolectomy. Left hemicolon diverticulosis without evidence of diverticulitis. Prostatomegaly. PROCEDURES   Unless otherwise noted below, none     Procedures    CRITICAL CARE TIME   Time: 35 minutes   Includes repeat examinations, speaking with consultants, lab interpretation, charting, giving blood transfusion due to concern for worsening anemia  Excludes separate billable procedures. Patient at risk for serious decompensation if not treated for this life-threatening condition. CONSULTS: Spoke with Dr. Ibeth Zapien at 3420 and initially he wanted to see if the patient could potentially go home since it does appear that his creatinine is normally above 2, and I felt this was reasonable but wanted to recheck labs before following through with this. He was also anemic today. I did give him some IV fluids and recheck the creatinine but also his hemoglobin and lactic acid. On repeat hemoglobin, it was now under 7 and therefore I do believe he will need a blood transfusion. CT scan showed diffuse metastasis but no specific concerns for things such as bowel obstruction or ischemic bowel. Therefore, I called back Dr. Ibeth Zapien and he ultimately agreed to admit the patient under observation.   IP CONSULT TO HOSPITALIST  IP CONSULT TO ONCOLOGY EMERGENCY DEPARTMENT COURSE and DIFFERENTIAL DIAGNOSIS/MDM:   Vitals:    Vitals:    01/04/21 1553 01/04/21 1800 01/04/21 1945 01/05/21 0237   BP: (!) 177/101 (!) 163/95 (!) 165/108 (!) 155/89   Pulse: 84  85 71   Resp: 18  20 16   Temp: 98.8 °F (37.1 °C)  98.1 °F (36.7 °C) 97.9 °F (36.6 °C)   TempSrc: Oral  Oral Oral   SpO2: 95%  96% 95%   Weight:       Height:           Patient was given the following medications:  Medications   0.9 % sodium chloride bolus (250 mLs Intravenous Not Given 1/4/21 1201)   hydroCHLOROthiazide (MICROZIDE) capsule 12.5 mg (12.5 mg Oral Given 1/4/21 1118)   melatonin disintegrating tablet 10 mg (10 mg Oral Given 1/4/21 2139)   metoprolol tartrate (LOPRESSOR) tablet 50 mg (50 mg Oral Given 1/4/21 2139)   pantoprazole (PROTONIX) tablet 40 mg (40 mg Oral Given 1/4/21 1117)   sodium chloride flush 0.9 % injection 10 mL (10 mLs Intravenous Given 1/4/21 2139)   sodium chloride flush 0.9 % injection 10 mL (has no administration in time range)   promethazine (PHENERGAN) tablet 12.5 mg (has no administration in time range)     Or   ondansetron (ZOFRAN) injection 4 mg (has no administration in time range)   polyethylene glycol (GLYCOLAX) packet 17 g (has no administration in time range)   acetaminophen (TYLENOL) tablet 650 mg (650 mg Oral Given 1/4/21 2139)     Or   acetaminophen (TYLENOL) suppository 650 mg ( Rectal See Alternative 1/4/21 2139)   rivaroxaban (XARELTO) tablet 15 mg (has no administration in time range)   hydrALAZINE (APRESOLINE) injection 10 mg (has no administration in time range)   0.9 % sodium chloride bolus (0 mLs Intravenous Stopped 1/4/21 5585) Patient was evaluated due to concern for lower abdominal pain over the last week although his symptoms actually improved today but still having concerns with urination. CT was obtained showing diffuse metastatic colon cancer although no significant findings to explain his decreased urination such as hydronephrosis. He was initially anemic although not requiring transfusion but due to concern for possibility of acute kidney injury, I did order IV fluids. Following this, repeat hemoglobin was checked and it was now 6.9 and therefore I do believe he would benefit from blood transfusion. Otherwise, he will need further evaluation in the hospital and may benefit from nephrology and oncology evaluations. He was in no acute distress at time of admission and felt comfortable with this plan. The patient tolerated their visit well. The patient and / or the family were informed of the results of any tests, a time was given to answer questions. FINAL IMPRESSION      1. Anemia, unspecified type    2. Metastatic colon cancer to liver ([facility]    3. Decreased urination    4. Chronic kidney disease, unspecified CKD stage          DISPOSITION/PLAN   DISPOSITION Admitted 01/04/2021 06:05:29 AM      PATIENT REFERRED TO:  No follow-up provider specified.     DISCHARGEMEDICATIONS:  Current Discharge Medication List          DISCONTINUED MEDICATIONS:  Current Discharge Medication List                 (Please note that portions of this note were completed with a voicerecognition program.  Efforts were made to edit the dictations but occasionally words are mis-transcribed.)    Ama Galvan MD (electronically signed)            Ama Galvan MD  01/05/21 8447

## 2021-01-04 NOTE — PROGRESS NOTES
Received bedside report from Amarilis Barnes, [facility]. Blood administration inititated prior to transfer. Patient admitted to room 109 from ED. Patient oriented to room, call light, bed rails, phone, lights and bathroom. Patient instructed about the schedule of the day including: vital sign frequency, lab draws, and possible tests. Patient instructed about prescribed diet, how to use 8MENU, and television. Bed alarm in place, patient aware of placement and reason. Bed locked, in lowest position, side rails up 2/4, call light within reach. Will continue to monitor.

## 2021-01-04 NOTE — ED NOTES

## 2021-01-04 NOTE — PROGRESS NOTES
has a past surgical history that includes hernia repair (Left, 08/25/15); hemicolectomy (09/24/2017); and Abdomen surgery (09/2017). Restrictions  Restrictions/Precautions  Restrictions/Precautions: General Precautions, Up as Tolerated, Fall Risk    Subjective   General  Chart Reviewed: Yes  Patient assessed for rehabilitation services?: Yes  Family / Caregiver Present: No  Referring Practitioner: UZIEL Anusionwu  Diagnosis: anemia  Subjective  Subjective: Pt agreeable to OT  General Comment  Comments: RN approved therapy  Patient Currently in Pain: Yes  Pain Assessment  Pain Assessment: 0-10  Pain Level: 5  Pain Type: Acute pain  Pain Location: Back;Buttocks  Non-Pharmaceutical Pain Intervention(s): Ambulation/Increased Activity;Repositioned; Therapeutic presence  Vital Signs  Temp: 98.8 °F (37.1 °C)  Temp Source: Oral  Pulse: 84  Heart Rate Source: Monitor  Resp: 18  BP: (!) 177/101  BP Location: Left Arm  MAP (mmHg): 126  Patient Currently in Pain: Yes  Oxygen Therapy  SpO2: 95 %  O2 Device: None (Room air)  Social/Functional History  Social/Functional History  Lives With: Spouse  Type of Home: House  Home Layout: One level  Home Access: Stairs to enter without rails  Entrance Stairs - Number of Steps: 2  Bathroom Shower/Tub: Walk-in shower, Shower chair with back  Bathroom Toilet: Handicap height  ADL Assistance: Independent  Homemaking Responsibilities: Yes(prior to last couple of week, used to [employer] and do dishes)  Meal Prep Responsibility: Secondary  Laundry Responsibility: Secondary  Cleaning Responsibility: Secondary  Shopping Responsibility: Secondary  Ambulation Assistance: Independent  Transfer Assistance: Independent  Active : Yes  Occupation: On disability  Type of occupation: apartment mgmt     Objective   Vision: Within Functional Limits  Hearing: Within functional limits    Orientation  Overall Orientation Status: Within Functional Limits     Balance  Sitting Balance: Supervision Standing Balance: Contact guard assistance(RW)  Standing Balance  Activity: Took steps in room with RW and CGA. Declined bathroom mobility. Transferred to chair with CGA. ADL  Feeding: Independent; Beverage management  LE Dressing: Moderate assistance; Increased time to complete(to change socks)  Toileting: Setup(urinal while seated)  Tone RUE  RUE Tone: Normotonic  Tone LUE  LUE Tone: Normotonic  Coordination  Movements Are Fluid And Coordinated: Yes     Bed mobility  Supine to Sit: Stand by assistance  Sit to Supine: Stand by assistance  Transfers  Stand Pivot Transfers: Contact guard assistance(rw)  Sit to stand: Contact guard assistance  Stand to sit: Contact guard assistance     Cognition  Overall Cognitive Status: WFL        Sensation  Overall Sensation Status: (per pt report, numbness B feet anc calves)      LUE AROM (degrees)  LUE AROM : WFL  RUE AROM (degrees)  RUE AROM : WFL  LUE Strength  Gross LUE Strength: WFL  RUE Strength  Gross RUE Strength: WFL       Plan   Plan  Times per week: 3-5x  Current Treatment Recommendations: Self-Care / ADL, Endurance Training, Balance Training, Functional Mobility Training, Safety Education & Training  AM-PAC Score   [facility] Inpatient Daily Activity Raw Score: 19 (01/04/21 1700)  AM-PAC Inpatient ADL T-Scale Score : 40.22 (01/04/21 1700)  ADL Inpatient CMS 0-100% Score: 42.8 (01/04/21 1700)  ADL Inpatient CMS G-Code Modifier : CK (01/04/21 1700)  Goals  Short term goals  Time Frame for Short term goals: 1 week (1/11) unless noted  Short term goal 1: Perform functional transfers with supervision and RW  Short term goal 2: Perform bathroom mobility with sueprvision and RW by 1/8  Short term goal 3: Stand at sink for 2 ADL tasks with supervision  Short term goal 4: Perform UE exer 15x each to improve endurance     Therapy Time   Individual Concurrent Group Co-treatment   Time In 1635         Time Out 1655         Minutes 20 Timed Code Treatment Minutes: 10 Minutes(10 min eval)    If pt is discharged prior to next OT session, this note will serve as the discharge summary.   Elise Martinez OT

## 2021-01-04 NOTE — ED NOTES
[facility] @ 0222  RE: acute on chronic kidney disease per Dr. Magaña Cancer  Dr. Salinas Jobs called back @0492       Karen Lopez  01/04/21 [id number]

## 2021-01-04 NOTE — ED NOTES
Pt alert and oriented. Pt mildly jaundice. Voiding yellow urine. Respirations easy and unlabored. Pt c/o generalized weakness.      Yazmin Garcia RN  01/04/21 0398

## 2021-01-05 NOTE — PLAN OF CARE
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 Admitted to [facility] on  - 10/02/2017 for SBO. A. CT abd/pelvis, 2017, showed a mass in the cecum with mesenteric, hepatic, left adrenal mets. CT of the chest later the same day showed only 9 x 6 mm RUL nodule. B. CEA, 2017, 400.3 ng/mL. C. Exploratory laparotomy, right colectomy, ileocolic anastomosis with omental biopsy, 2017, with Dr. Himanshu Arguello. Pathology showed an invasive moderately differentiated colonic adenocarcinoma. 10 positive LNs out of 45. Omental biopsy was positive. 2. First hospital follow up planned, 10/16/2017.    3. Left subclavian Port-A-Cath placement, 2017, with Dr. Himanshu Arguello. He experiences intermittent nausea. Primarily at night. His existing antiemetics are not consistently helpful. Otherwise, he has not been on Regorafenib long enough yet to notice any other consistent side effects/symptoms. ----------------------------------------------------------------------------------------------------------------------------  .3 ----> 19.0 ----> 4.4 ----> 6.0 ----> 5.5 ----> 5.7 ----> 8.4 ----> 8.6 ----> 8.7 ----> 10.7 ----> 12.9 ----> 9.0 ---->   9/23/17 2/05/18 7/9/18 7/23/18 8/06/18 9/10 10/08 10/22 11/05/18 11/19/18 12/17/18 1/28/19    6.7 ----> 6.0 ----> 5.93 ----> 9.38 ----> 7.19 ----> 11.65 ----> 11.68 ----> 12.57 ---->11.78 ----> 13.0 ----> 20.9 ----> 25.34  2/25/19 3/11/19 4/22/19 6/03/19 6/17/19 7/01/19 7/15/19 7/29/19 8/19/19 9/09/19 10/07/19 10/21/19    31.3 ----> 50.54 ----> 58.91 ----> 71.46 ----> 121.40 ----> 249.08 ----> 773. 15 ----> 867.65 ----> 1245 ----> 2134.46 ---->  11/04/19 12/2/19 12/30/19 2/10/20 2/24/20 3/23/20 5/04/20 6/01/20 6/29/20 8/03/20    2995. 10 ----> 5765.02 ----> 6789.09 ----> 7953.02 ---->   8/31/20 10/07/20 11/02/20 12/02/20  Review of Systems  Per interval history; otherwise 10 point ROS is negative   Vital Signs  Blood pressure: 110/70, Pulse: 60, Temperature: 97.4 F, Respirations: 12, O2 sat: , Pain Scale: 0, Height: 69 in, Weight: 158.2 lb, BSA: 1.87, BMI: 23.36 kg/m2   Physical Exam   CONSTITUTIONAL: awake, alert, cooperative, no apparent distress   EYES: pupils equal, round and reactive to light, sclera clear and conjunctiva normal   ENT: Normocephalic, without obvious abnormality, atraumatic   NECK: supple, symmetrical, no jugular venous distension and no carotid bruits   HEMATOLOGIC/LYMPHATIC: no cervical, supraclavicular or axillary lymphadenopathy   LUNGS: no increased work of breathing and clear to auscultation   CARDIOVASCULAR: regular rate and rhythm, normal S1 and S2, no murmur noted ABDOMEN: normal bowel sounds x 4, soft, non-distended, non-tender, no masses palpated, no hepatosplenomegaly   MUSCULOSKELETAL: full range of motion noted, tone is normal   NEUROLOGIC: awake, alert, oriented to name, place and time. Motor skills grossly intact. SKIN: Normal skin color, texture, turgor and no jaundice.  appears intact   EXTREMITIES: no LE edema   Labs  CBC   Lab Results 12/22/2020 12/02/2020 11/18/2020 11/02/2020 10/22/2020 10/21/2020   CBC                     WBC x 10^3/uL 9.4 (H) 4.0 (L) 4.9 2.6 (L)    3.6 (L)   RBC x 10^6/uL 3.31 (L) 2.72 (L) 2.13 (L) 2.31 (L)    2.13 (L)   HGB g/dL 9.9 (L) 8.2 (L) 6.2 (L) 6.6 (L)    6.1 (L)   HCT % 30.7 (L) 26.5 (L) 19.3 (L) 21.1 (L)    19.6 (L)   MCV fL 92.7 (H) 97.4 (H) 90.6 91.3    92.0   MCH pg 29.9 30.1 29.1 28.6    28.6   MCHC g/dL 32.2 (L) 30.9 (L) 32.1 (L) 31.3 (L)    31.1 (L)   RDW-CV, % 21.8 (H) 20.5 (H) 17.9 (H) 18.7 (H)    17.5 (H)   PLT x 10^3/uL 152.0 (L) 160.0 (L) 111.0 (L) 204.0    208.0   Marianne % 78.1 (H) 65.1 74.3 (H) 59.3    61.6   LY % 4.3 (L) 12.6 (L) 13.3 (L) 15.2 (L)    30.9   MO % 16.6 (H) 20.7 (H) 9.8 24.0 (H)    5.0 (L)   EO % 0.9 1.3 0.6 (L) 1.1    1.1   BA % 0.1 (L) 0.3 2.0 (H) 0.4    1.4 (H)   Marianne # (ANC) x 10^3/uL 7.34 (H) 2.59 3.64 1.56 (L)    2.21   LY # x 10^3/uL 0.40 (L) 0.50 (L) 0.65 (L) 0.40 (L)    1.11 (L)   MO # x 10^3/uL 1.56 (H) 0.82 0.48 0.63    0.18 (L)   EO # x 10^3/uL 0.08 0.05 0.03 (L) 0.03 (L)    0.04   BA # x 10^3/uL 0.01 0.01 0.10 (H) 0.01    0.05   CMP   Lab Results 12/22/2020 12/02/2020 11/18/2020 11/02/2020 10/22/2020 10/21/2020   Chemistries                     Glucose mg/dL 157 (H) 124 (H)    128 (H)    179 (H)   BUN mg/dL 55 (H) 39 (H)    57 (H)    66 (H)   Creatinine mg/dL 2.42 (H) 2.60 (H)    2.66 (H)    2.98 (H)   BUN/Creatinine ratio 22.7 15.0    21.4    22.1   Sodium mmol/L 135 (L) 134 (L)    137    135 (L)   Potassium mmol/L 4.0 4.7    3.9    3.9   Chloride mmol/L 98 100    102    98 CO2 mmol/L 27.8 27.9    24.8    26.9   Anion gap, mmol/L 9.2 6.1    10.2    10.1   Calcium mg/dL 8.3 (L) 8.4 (L)    7.9 (L)    8.2 (L)   Albumin g/dL 1.6 (L) 2.0 (L)    2.1 (L)    2.4 (L)   Total protein g/dL 5.6 (L) 5.6 (L)    5.5 (L)    5.8   A/G ratio 0.4 (L) 0.6 (L)    0.6 (L)    0.7 (L)   Bilirubin, total mg/dL 4.8 (H) 1.0    0.6    0.7   Alkaline phosphatase U/L 587 (H) 291 (H)    275 (H)    308 (H)   AST/SGOT U/L 137 (H) 55 (H)    58 (H)    54 (H)   ALT/SGPT U/L 70 (H) 23    24    22   GFR non-African American, estimated mL/min/1.73m2 27.2 (L) 25.1 (L)    24.4 (L)    21.4 (L)   GFR African American, estimated mL/min/1.73m2 32.9 (L) 30.3 (L)    29.5 (L)    25.9 (L)     Lab Results 12/22/2020 12/02/2020 11/18/2020 11/02/2020 10/22/2020 10/21/2020   Tumor Markers                     CEA ng/mL >86569.00 (H) 7953.02 (H)    6789.09 (H)           Lab Results 12/22/2020 12/02/2020 11/18/2020 11/02/2020 10/22/2020 10/21/2020   Immunohematology                     ABO/Rh       O POS O POS O POS O POS   Antibody screen       NEG NEG NEG NEG     Lab Results 12/22/2020 12/02/2020 11/18/2020 11/02/2020 10/22/2020 10/21/2020   Anemia Labs                     Iron / FE ug/dL          18 (L)         TIBC ug/dL          215 (L)         Soluble transferrin receptor, mg/L          6.7 (H)         Ferritin ng/mL          184.2         Iron, % saturation %          8 (L)         Vitamin B12 pg/mL          >2000 (H)         Folate, serum ng/mL          20.96         Imaging  CT CHEST ABDOMEN PELVIS W CONTRAST (7/22/2019): Impression  Stable appearance of the chest, including a solitary right upper lobe 5 mm  pulmonary nodule and numerous tiny 1 mm nodules along the left-sided pleural.    Stable CT of the abdomen and pelvis. Stable lesions in the liver presumably due to metastatic disease. Stable indeterminate small 1.2 cm left adrenal nodule. Stable tiny subcentimeter nodules along the omentum. CT CHEST ABDOMEN PELVIS W CONTRAST (10/24/2019)  IMPRESSION:  Increased patchy nodularity left lower lobe with morphology favoring  postinflammatory-infectious change. Punctate pulmonary nodule right upper  lobe described previously is unchanged  Hepatic metastatic disease. A few nodules appear slightly larger. There  appears to be 1 new nodule anteriorly in the right hepatic lobe  No obvious change in peritoneal implants and left adrenal nodule. CT CHEST ABDOMEN PELVIS W CONTRAST (12/17/2019): Impression  Hepatic metastatic disease is again demonstrated, with some lesions slightly  larger as compared to the prior examination. No significant interval change in omental nodularity as compared to the prior  exam.    Decreasing left lower lobe infiltrate as compared to prior examination,  likely improving pneumonitis. An anterior right pulmonary nodule is not  significantly changed. Ventral hernia containing small bowel, though without evidence of bowel  dilatation to suggest obstruction. Prostatomegaly. CT CHEST ABDOMEN PELVIS WO CONTRAST (2/27/2020): Impression  1.1 x 1.0 cm lesion posterior to dominant cyst in the right hepatic lobe  appears slightly decreased in size since the previous exam.    2 low-attenuation lesions (1 near the falciform ligament in the other  inferior right hepatic lobe medially) have increased in size since the  previous exam.    No significant change in omental nodules. No significant change in appearance of the chest.      CT CHEST ABDOMEN PELVIS WO CONTRAST (5/26/2020): Impression  1. Interval development of pulmonary metastatic disease. 2. Mild interval progression of hepatic metastatic disease. 3. Mild interval progression of omental/peritoneal metastatic disease. CT CHEST ABDOMEN PELVIS WO CONTRAST (6/25/2020): Impression  1. Slight enlargement of pulmonary and peritoneal metastases  2.  No appreciable change in hepatic metastatic disease CT CHEST ABDOMEN PELVIS WO CONTRAST (8/24/2020): Impression   1. Minimal interval progression of pulmonary and peritoneal metastatic   disease. 2. Stable hepatic metastatic disease. CT CHEST ABDOMEN PELVIS WO CONTRASRT (11/24/2020): Impression   1. Significant interval progression of pulmonary metastatic disease. 2. Mild interval progression of hepatic and omental/peritoneal metastatic   disease. OCM - Patient Care Management   Pain, if applicable:    Date of Service: 12/22/2020  Pain Scale (0-10): 0  Pain Treatment Plan:  Comment:         ECOG Status 1 Symptoms, but ambulatory. Restricted in physically strenuous activity, but ambulatory and able to carry out work of a light or sedentary nature (e.g., light housework, office work). (Date: 12/22/2020)   Depression Status Was screened; Outcome positive: No; Screening Date: 07/20/2020; Screening Tool: Patient Health Questionnaire (PHQ9)   Psycho-social PHQ-9 Follow-up Plan (if applicable):      Research    Would you like this patient screened for clinical trial eligibility? If yes, please enter the order for \"Research: Screen for Eligibility\"    Assessment & Plan  1. Metastatic adenocarcinoma of the cecum, KRAS mutated, MSI neg - He was admitted to [facility] between 9/23 - 10/02/2017 for a SBO. Found to have a mass in the cecum and underwent an ex lap with right colectomy and ileocolic anastomosis with Dr. Duane Hoyle on 9/24/2017. Had liver mets, a left adrenal met, and mesenteric mets at diagnosis. Port placed on 11/03/2017. He received FLOX/Avastin between 11/06/2017 - 1/16/2018. FLOX was selected to avoid a CADD pump due to insurance issues. Then transitioned to FOLFOX/Avastin after the new year when his insurance changed and treated for 8 cycles until 6/04/2018. Then switched to 5-FU/LV/Avastin maintenance due to thrombocytopenia and received 11 cycles until 11/19/2018. Treatment was discontinued due to early progression in the liver. He received FOLFIRI/Avastin x 26 cycles between 12/03/2018 - 2/24/2020. Treatment was discontinued after a CT scan on 2/27/2020 showed progression in the liver. He then signed consent for USO 70144 on 3/10/2020, but his tumor was negative for WT1, so this was not an option for him. He received Lonsurf between 4/06 - 11/15/2020. Treatment was discontinued after a CT scan on 11/24/2020 showed progression in the lungs, liver, peritoneum. He started Regorafenib on 12/15/2020. Regorafenib is taken 80 mg PO on days 1-21 of a 28 day cycle. It is taken after a low fat meal. Monitor for hypertension, platelet dysfunction, fistula formation, hepatotoxicity, rash, thromboembolic events. 2. Hypertension - Takes Metoprolol 50 mg BID and Amlodipine 10 mg daily. Added HCTZ 12.5 mg on 8/20/2018    3. Insomnia - Takes Melatonin 20 mg. Prescribed Zolpidem 5 mg tabs on 10/07/2020.    4. Chemo-induced nausea - Good control with Zofran. Last prescribed in 11/05/2018.     5. Diminished appetite - I previously provided him with the office number to Dr. Jose Bashir. He was given an extra week off between cycles #15 and #16 of FOLFIRI/Avastin. 6. Hypokalemia - Takes KCl ER 20 meq BID. 7. GERD - Prescribed Protonix 40 mg on 8/31/2020.    8. Anemia - Will update micronutrients on 11/02/2020 showed: Ferritin 184.2 ng/mL, iron sat 8%, B12 >2000 pg/mL, folate 20.96 ng/mL. Suspect anemia is simply due to chemo and chronic disease/inflammation. 9. Pain - Pain contract signed today (12/22/2020). Has stage IV disease - Does not require a Controlled Substance Plan. Will prescribe Oxycodone 5 mg tabs. 10. Nausea - Will try Lorazepam, as he tends to get nauseated in the evenings and his current antiemetics are not consistently providing relief. 11. Plan - Continue Regorafenib 80 mg. Add Lorazepam and Oxycodone. RTO 2 weeks for NITIN and labs. .  Recent imaging and labs were reviewed and discussed with the patient. I have recommended that the patient follow CDC guidelines for prevention of COVID-19 infection.     Tera Nichols MD     Note Recipient:      Electronically signed by Tera Nichols MD 01/04/2021 14:07 EST

## 2021-01-05 NOTE — CARE COORDINATION
CASE MANAGEMENT DISCHARGE SUMMARY      Discharge to: Home with referral to [address] ordered/agency: rolling walker to be provided by [facility]na prior to discharge    Transportation:    Family/car: yes    Confirmed discharge plan with:   Patient: yes   Facility/Agency, name:  [facility] liaison will follow up with pt to verify he wants home care services.     MARIANA Garces-MONE

## 2021-01-05 NOTE — CARE COORDINATION
[facility]    Referral received from CM to follow for home care services. I will follow for needs, and speak with patient to verify demos.     Patient is undecided about home care referral  He will let me know in a couple hours after speaking with his wife if he wants home care     [address] [place] [address] notified    Narcisa Brown RN, BSN CTN  [facility] [phone]

## 2021-01-05 NOTE — PROGRESS NOTES
Patient provided written and oral discharge instructions. Patient informed his scripts had been sent to his normal pharmacy for . Patient's IV removed and had lots of bleeding. Pressure held on IV site for 2 minutes. Bleeding had slowed but not completely stopped, so pressure dressing was applied with guaze and kelix. Patient was then wheeled out of the facility and discharged into care of his wife.  [facility]

## 2021-01-05 NOTE — DISCHARGE INSTR - COC
Continuity of Care Form    Patient Name: Weston Sierra   :  1955  MRN:  [id number]    Admit date:  1/3/2021  Discharge date:  21    Code Status Order: Full Code   Advance Directives:      Admitting Physician:  Kushal Howard MD  PCP: Aylin Page DO    Discharging Nurse: [facility] Unit/Room#: 1099/4022-78  Discharging Unit Phone Number: ***    Emergency Contact:   Extended Emergency Contact Information  Primary Emergency Contact: Ragini Levine  Address:  [address] [facility], [address] [facility],[address] Phone: [phone]  Mobile Phone: [phone]  Relation: Spouse    Past Surgical History:  Past Surgical History:   Procedure Laterality Date    ABDOMEN SURGERY  2017    Hemicolectomy    HEMICOLECTOMY  2017    RIGHT COLON MASS WITH SMALL BOWEL OBSTRUCTION              HERNIA REPAIR Left 08/25/15    open left inguinal hernia repair with mesh       Immunization History:   Immunization History   Administered Date(s) Administered    Influenza 10/16/2013    Influenza Vaccine, unspecified formulation 2017    Influenza, MDCK Quadv, IM, PF (Flucelvax 4 yrs and older) 2017    Pneumococcal Conjugate 13-valent (Aidee Lento) 2019       Active Problems:  Patient Active Problem List   Diagnosis Code    Essential hypertension I10    Hyperlipemia E78.5    DM (diabetes mellitus) ([facility] E11.9    Left inguinal hernia K40.90    S/P left inguinal hernia repair Z98.890, Z87.19    Abdominal pain R10.9    Colonic mass K63.89    Paroxysmal atrial fibrillation (HCC) I48.0    Anemia D64.9    CKD (chronic kidney disease) N18.9    Colon cancer ([facility] C18.9       Isolation/Infection:   Isolation            No Isolation          Patient Infection Status       None to display            Nurse Assessment: Last Vital Signs: BP (!) 153/102   Pulse 86   Temp 97.6 °F (36.4 °C) (Oral)   Resp 16   Ht 5' 10\" (1.778 m)   Wt 160 lb (72.6 kg)   SpO2 97%   BMI 22.96 kg/m²     Last documented pain score (0-10 scale): Pain Level: 7  Last Weight:   Wt Readings from Last 1 Encounters:   01/03/21 160 lb (72.6 kg)     Mental Status:  oriented, alert, coherent, logical, thought processes intact and able to concentrate and follow conversation    IV Access:  - unaccessed chest port    Nursing Mobility/ADLs:  Walking   Assisted  Transfer  Assisted  Bathing  Independent  Dressing  Independent  [address] Delivery   whole    Wound Care Documentation and Therapy:        Elimination:  Continence:   · Bowel: Yes  · Bladder: Yes  Urinary Catheter: None   Colostomy/Ileostomy/Ileal Conduit: No       Date of Last BM: 1/5/21    Intake/Output Summary (Last 24 hours) at 1/5/2021 1317  Last data filed at 1/5/2021 0620  Gross per 24 hour   Intake 320 ml   Output 350 ml   Net -30 ml     I/O last 3 completed shifts: In: 273 [P.O.:777]  Out: 550 [Urine:550]    Safety Concerns: At Risk for Falls    Impairments/Disabilities:      generalized weakness    Nutrition Therapy:  Current Nutrition Therapy:   - Oral Diet:  General    Routes of Feeding: Oral  Liquids: Thin Liquids  Daily Fluid Restriction: no  Last Modified Barium Swallow with Video (Video Swallowing Test): not done    Treatments at the Time of Hospital Discharge:   Respiratory Treatments: n/a    Oxygen Therapy:  is not on home oxygen therapy.   Ventilator:    - No ventilator support    Rehab Therapies: Physical Therapy, Occupational Therapy and Nurse  Weight Bearing Status/Restrictions: No weight bearing restirctions  Other Medical Equipment (for information only, NOT a DME order):  walker  Other Treatments: HOME HEALTH CARE: LEVEL 3 [address] to establish plan of care for patient over 60 day period Nursing  Initial home SN evaluation visit to occur within 24-48 hours for:  1)  medication management  2)  VS and clinical assessment  3)  S&S chronic disease exacerbation education + when to contact MD/NP  4)  care coordination  Medication Reconciliation during 1st SN visit  PT/OT/Speech   Evaluations in home within 24-48 hours of discharge to include DME and home safety   Frontload therapy 5 days, then 3x a week   OT to evaluate if patient has [address] needs for personal care    evaluation within 24-48 hours to evaluate resources & insurance for potential AL, [place], LTC, and Medicaid options   Palliative Care referral within 5 days of hospital discharge   PCP Visit scheduled within 3 - 7 days of hospital discharge [address] (If patient is agreeable and meets guidelines)      Patient's personal belongings (please select all that are sent with patient):  {CHP DME Belongings:[id number]:::0}    RN SIGNATURE:  Electronically signed by Prakash Curry RN on 1/5/21 at 4:00 PM EST    CASE MANAGEMENT/SOCIAL WORK SECTION    Inpatient Status Date: ***    Readmission Risk Assessment Score:  Readmission Risk              Risk of Unplanned Readmission:        0           Discharging to Facility/ Agency   Name:  [facility] care    Address: [address] [place] [address], [address] [place], [place] [address] [address]  Phone: [phone]  Fax: [phone]    / signature: Electronically signed by Rena Gupta RN on 1/5/21 at 2:39 PM EST    PHYSICIAN SECTION    Prognosis: Fair    Condition at Discharge: Stable    Rehab Potential (if transferring to Rehab): Fair    Recommended Labs or Other Treatments After Discharge: Per Discharge instructions      Physician Certification: I certify the above information and transfer of Kelsi Valencia  is necessary for the continuing treatment of the diagnosis listed and that he requires Home Care for less 30 days.

## 2021-01-05 NOTE — DISCHARGE INSTR - DIET
? Good nutrition is important when healing from an illness, injury, or surgery. Follow any nutrition recommendations given to you during your hospital stay. ? If you were given an oral nutrition supplement while in the hospital, continue to take this supplement at home. You can take it with meals, in-between meals, and/or before bedtime. These supplements can be purchased at most local grocery stores, pharmacies, and chain [employer]-stores. ? If you have any questions about your diet or nutrition, call the hospital and ask for the dietitian.       General

## 2021-01-05 NOTE — DISCHARGE SUMMARY
Hospital Medicine Discharge Summary    Patient ID: Paco Washington      Patient's PCP: Jona Shearer DO    Admit Date: 1/3/2021     Discharge Date: 1/5/2021      Admitting Physician: Niesha Stanley MD     Discharge Physician: Niesha Stanley MD     Discharge Diagnoses: Active Hospital Problems    Diagnosis    Anemia [D64.9]     Priority: High    Paroxysmal atrial fibrillation (HCC) [I48.0]     Priority: Low    CKD (chronic kidney disease) [N18.9]    Colon cancer ([facility] [address] [C18.9]    Essential hypertension [I10]       The patient was seen and examined on day of discharge and this discharge summary is in conjunction with any daily progress note from day of discharge.     HPI : 63 y.o. male with history of paroxysmal A. fib on anticoagulation with Xarelto, metastatic colon cancer, hypertension, hyperlipidemia, DM type II who presented to [facility] with dysuria and generalized weakness with fatigue for about one week. Hx  mainly obtained by patient and  EMR. Patient  has been having poor p.o. intake. Was having difficulty performing his ADLs due to generalized weakness and wife brought him to the ER to be evaluated. Denies any hematuria, fever, chills, sick contacts, cough, abdominal painhematochezia or melena. He was noted to have elevated lactic acid and leukocytosis in the ER. Stable CKD. UA negative for UTI. CT showed extensive metastatic disease to the liver and lungs which are chronic, diverticulosis with no evidence of diverticulitis. Initial hemoglobin on presentation was 7.6 which dropped to 6.9 after he had received IV fluids in the ER. Blood transfusion was ordered. He was admitted overnight for further care. Patient reports he feels better today after blood transfusion. States that he  gets blood transfusion intermittently by his oncologist, Dr. Anne Quiñones for his cancer. He is currently on daily chemo pills for his colon cancer which was recently changed by his oncologist but not sure the name of the medication. Hospital Course:     Acute on chronic anemia: presented with hgb 7. 6.  likely multifactorial due to his known hx of colon cancer, chemotherapy induced. Hgb dropped after IVF with no overt signs of GIB. S/p 1 PRBC with improvement. Hgb remained stable at d/c at 9.5. Iron stores were low on iron studies. Received IV venofer X1 inpatient which will be completed outpatient by hemonc.          Metastatic colon cancer with mets to liver and lungs: s/p rt hemicolectomy. Follows with Dr Anne Quiñones and  eusebio Perkins daily.  Has close outpatient f/u with hemonc tomorrow        Paroxysmal A fib: rate controlled. NSR on EKG. Continue BB and anticoagulation with xarelto ( reduced dose to 15mg due to CKD/ <GFR).    Transaminitis with hyperbilirubinemia : likely due to liver mets, chemo induced. Statin d/annmarie. LFT's monitored- remained stable     Hyperlipidemia: stopped statin due to elevated LFT.       HTN : controlled. continue home BP meds.         Lactic acidosis : likely due to vol depletion. Not septic on exam.  Normalized after volume resuscitation with IV fluids.     Leucocytosis: likely reactive. No overt signs of infection. UA neg for UTI. Resolved         Generalized weakness. Likely due to severe debilitation and anemia. PT/OT rec 24 hr assist which would be provided by his family. HHC and walker were arranged by [facility].        Physical Exam Performed:     BP (!) 153/102   Pulse 86   Temp 97.6 °F (36.4 °C) (Oral)   Resp 16   Ht 5' 10\" (1.778 m)   Wt 160 lb (72.6 kg)   SpO2 97%   BMI 22.96 kg/m²       Labs: For convenience and continuity at follow-up the following most recent labs are provided:    General appearance:  No apparent distress, appears stated age and cooperative. HEENT:  Normal cephalic, atraumatic without obvious deformity. Pupils equal, round,   Mildly icteric  Neck: Supple, with full range of motion. No jugular venous distention. Trachea midline. Respiratory:  Normal respiratory effort. Clear to auscultation, bilaterally without Rales/Wheezes/Rhonchi. Cardiovascular:  Regular rate and rhythm with normal S1/S2 without murmurs, rubs or gallops. Abdomen: Soft, non-tender, non-distended with normal bowel sounds. Musculoskeletal:  No clubbing, cyanosis or edema bilaterally.    Skin: Warm and dry  Neurologic: Alert, speech clear with no overt facial droop psychiatric:  Alert and oriented, thought content appropriate, normal insight     CBC:    Lab Results   Component Value Date    WBC 12.7 01/05/2021    HGB 9.5 01/05/2021    HCT 30.0 01/05/2021  01/05/2021       Renal:    Lab Results   Component Value Date     01/05/2021    K 3.8 01/05/2021    K 4.7 01/03/2021     01/05/2021    CO2 20 01/05/2021    BUN 49 01/05/2021    CREATININE 2.2 01/05/2021    CALCIUM 8.1 01/05/2021    PHOS 2.8 10/02/2017         Significant Diagnostic Studies    Radiology:   CT ABDOMEN PELVIS WO CONTRAST   Final Result   Innumerable metastases in the lower lungs, stable to mildly progressed. Extensive metastatic disease to the liver is again noted. Small to moderate volume ascites. Probable mesenteric and omental metastatic implants. Redemonstrated postsurgical changes from partial right hemicolectomy. Left hemicolon diverticulosis without evidence of diverticulitis. Prostatomegaly.                 Consults:     IP CONSULT TO HOSPITALIST  IP CONSULT TO ONCOLOGY  IP CONSULT TO CASE MANAGEMENT  IP CONSULT TO HOME CARE NEEDS    Disposition: Home with home health care    Condition at Discharge: Stable    Discharge Instructions/Follow-up:   - Follow-up with heme-onc on 1/6/2020 as scheduled          Code Status:  Prior     Activity: activity as tolerated    Diet: cardiac diet      Discharge Medications:     Discharge Medication List as of 1/5/2021  4:00 PM           Details   rivaroxaban (XARELTO) 15 MG TABS tablet Take 1 tablet by mouth daily, Disp-42 tablet, R-1Normal      metoprolol tartrate (LOPRESSOR) 100 MG tablet Take 1 tablet by mouth 2 times daily, Disp-60 tablet, R-3Normal              Details   pantoprazole sodium (PROTONIX) 40 MG PACK packet Take 40 mg by mouth every morning (before breakfast)Historical Med      melatonin 3 MG TABS tablet Take 10 mg by mouth nightly as neededHistorical Med      Multiple Vitamins-Minerals (THERAPEUTIC MULTIVITAMIN-MINERALS) tablet Take 1 tablet by mouth dailyHistorical Med nystatin (MYCOSTATIN) 070503 UNIT/ML suspension Take 5 mLs by mouth 4 times daily, Oral, 4 TIMES DAILY Starting Mon 8/5/2019, Disp-120 mL, R-1, Normal      hydrochlorothiazide (MICROZIDE) 12.5 MG capsule Take 12.5 mg by mouth dailyHistorical Med      potassium chloride (KLOR-CON M) 20 MEQ TBCR extended release tablet Take 40 mEq by mouth daily (with breakfast) Historical Med      Ondansetron HCl (ZOFRAN PO) Take by mouthHistorical Med             Time Spent on discharge is more than 30 minutes in the examination, evaluation, counseling and review of medications and discharge plan. Signed:    Carlos English MD   1/7/2021      Thank you Smith DO Rudy for the opportunity to be involved in this patient's care. If you have any questions or concerns please feel free to contact me at [phone].

## 2021-01-05 NOTE — CONSULTS
[facility]    Consult Note      Requesting Physician:  Dr. Young Carr:  Anemia, colon cancer     HISTORY OF PRESENT ILLNESS:      Mr. Ana Wagner  is a 63 y.o. male we are seeing in consultation for colon cancer on Stivarga 80 mg daily chemo pill admitted for dysuria and weakness X 1 week. UA negative for infection. Hgb was 6.9 and he is s/p PRBC. He is feeling better and ready to go home. Iron stores are very low at 16. Denies melena or BRBPR. No LE pain. NO diarrhea. No SOB or chest pain. He is on PPI. He has a fibb and maintained on Xarelto as well. Hgb today is 9.5. He has an outpatient appointment tomorrow to see Dr. Xavi Strong.        Past Medical History:    Past Medical History:   Diagnosis Date    Atrial fibrillation ([facility] [address]     CAD (coronary artery disease)     Hx of A fib    Colon cancer ([facility] [address] 09/2017    stage 4     Hyperlipidemia     Hypertension     PAF (paroxysmal atrial fibrillation) (HCC)     Type 2 diabetes mellitus without complication (HCC)     Type II or unspecified type diabetes mellitus without mention of complication, not stated as uncontrolled      Past Surgical History:    Past Surgical History:   Procedure Laterality Date    ABDOMEN SURGERY  09/2017    Hemicolectomy    HEMICOLECTOMY  09/24/2017    RIGHT COLON MASS WITH SMALL BOWEL OBSTRUCTION              HERNIA REPAIR Left 08/25/15    open left inguinal hernia repair with mesh       Current Medications:    Current Facility-Administered Medications   Medication Dose Route Frequency Provider Last Rate Last Admin    metoprolol tartrate (LOPRESSOR) tablet 100 mg  100 mg Oral BID Rj Araiza MD   100 mg at 01/05/21 0909    0.9 % sodium chloride bolus  250 mL Intravenous Once Soheila Gresham MD        hydroCHLOROthiazide (MICROZIDE) capsule 12.5 mg  12.5 mg Oral Daily Jose Eli MD   12.5 mg at 01/05/21 4858  melatonin disintegrating tablet 10 mg  10 mg Oral Nightly PRN Chery Joyce MD   10 mg at 01/04/21 2139    pantoprazole (PROTONIX) tablet 40 mg  40 mg Oral QAM AC Chery Joyce MD   40 mg at 01/05/21 0617    sodium chloride flush 0.9 % injection 10 mL  10 mL Intravenous 2 times per day Chery Joyce MD   10 mL at 01/05/21 0910    sodium chloride flush 0.9 % injection 10 mL  10 mL Intravenous PRN Chery Joyce MD        promethazine (PHENERGAN) tablet 12.5 mg  12.5 mg Oral Q6H PRN Chery Joyce MD        Or    ondansetron (ZOFRAN) injection 4 mg  4 mg Intravenous Q6H PRN Chery Joyce MD        polyethylene glycol (GLYCOLAX) packet 17 g  17 g Oral Daily PRN Chery Joyce MD        acetaminophen (TYLENOL) tablet 650 mg  650 mg Oral Q6H PRN Chery Joyce MD   650 mg at 01/05/21 8572    Or    acetaminophen (TYLENOL) suppository 650 mg  650 mg Rectal Q6H PRN Chery Joyce MD        rivaroxaban (XARELTO) tablet 15 mg  15 mg Oral Daily Rj Araiza MD   15 mg at 01/05/21 0909    hydrALAZINE (APRESOLINE) injection 10 mg  10 mg Intravenous Q6H PRN Rj Araiza MD         Allergies:  No Known Allergies    Social History:   Social History     Socioeconomic History    Marital status:      Spouse name: Jasmeet Blakely Number of children: 2    Years of education: Not on file    Highest education level: Not on file   Occupational History    Occupation: maintenance     Employer: [employer] resource strain: Not on file    Food insecurity     Worry: Not on file     Inability: Not on file    Transportation needs     Medical: Not on file     Non-medical: Not on file   Tobacco Use    Smoking status: Never Smoker    Smokeless tobacco: Never Used   Substance and Sexual Activity    Alcohol use: No     Alcohol/week: 0.0 standard drinks    Drug use: Yes     Frequency: 1.0 times per week Types:  Marijuana     Comment: 1-2x per week    Sexual activity: Yes     Partners: Female   Lifestyle    Physical activity     Days per week: Not on file     Minutes per session: Not on file    Stress: Not on file   Relationships    Social connections     Talks on phone: Not on file     Gets together: Not on file     Attends Confucianism service: Not on file     Active member of club or organization: Not on file     Attends meetings of clubs or organizations: Not on file     Relationship status: Not on file    Intimate partner violence     Fear of current or ex partner: Not on file     Emotionally abused: Not on file     Physically abused: Not on file     Forced sexual activity: Not on file   Other Topics Concern    Not on file   Social History Narrative    Not on file          Family History:     Family History   Problem Relation Age of Onset    Diabetes Father     High Blood Pressure Father     High Cholesterol Father     High Cholesterol Mother     High Blood Pressure Mother     Cancer Sister         site unknown     REVIEW OF SYSTEMS:    Review of Systems  10 point ROS negative unless noted above   PHYSICAL EXAM:      Vitals:  BP (!) 153/102   Pulse 86   Temp 97.6 °F (36.4 °C) (Oral)   Resp 16   Ht 5' 10\" (1.778 m)   Wt 160 lb (72.6 kg)   SpO2 97%   BMI 22.96 kg/m²     CONSTITUTIONAL:  awake, alert, cooperative, no apparent distress, and appears stated age NAD  EYES:  pupils equal, round and reactive to light, extra ocular muscles intact, sclera clear, conjunctiva normal  NECK:Supple, symmetrical, trachea midline, no adenopathy, thyroid symmetric, not enlarged and no tenderness, skin normal  HEMATOLOGIC/LYMPHATICS:  no cervical lymphadenopathy, no supraclavicular lymphadenopathy, no axillarylymphadenopathy and no inguinal lymphadenopathy  LUNGS:  No increased work of breathing, good air exchange, clear to auscultation bilaterally, no crackles or wheezing CARDIOVASCULAR:  , regular rate and rhythm, normalS1 and S2, no S3 or S4, and no murmur noted  ABDOMEN:  No scars, normal bowel sounds, soft, non-distended, non-tender, no masses palpated, no hepatosplenomegally  MUSCULOSKELETAL:  There is no redness, warmth,or swelling of the joints. Full range of motion noted. Motor strength is 5 out of 5 all extremities bilaterally. NEUROLOGIC:  Awake, alert, oriented to name, place and time. Cranial nerves II-XII are grossly intact. Motor is 5 out of 5 bilaterally. SKIN:  no bruising or bleeding  Ext: BLE +3 pitting edema       DATA:    PT/INR:    Recent Labs     01/03/21  2150 01/04/21  0440   PROT 6.2* 5.7*     PTT:  No results for input(s): APTT in the last 72 hours. CMP:    Lab Results   Component Value Date     01/05/2021    K 3.8 01/05/2021    K 4.7 01/03/2021     01/05/2021    CO2 20 01/05/2021    BUN 49 01/05/2021    PROT 5.7 01/04/2021     :    Lab Results   Component Value Date    MG 2.00 01/03/2021     Phosphorus:  No components found for: PO4  Calcium:  No components found for: CA  CBC:    Lab Results   Component Value Date    WBC 12.7 01/05/2021    RBC 3.33 01/05/2021    HGB 9.5 01/05/2021    HCT 30.0 01/05/2021    MCV 90.3 01/05/2021    RDW 20.1 01/05/2021     01/05/2021     DIFF:  Lab Results   Component Value Date    MCV 90.3 01/05/2021    RDW 20.1 01/05/2021       Innumerable metastases in the lower lungs, stable to mildly progressed.       Extensive metastatic disease to the liver is again noted.       Small to moderate volume ascites.       Probable mesenteric and omental metastatic implants.       Redemonstrated postsurgical changes from partial right hemicolectomy.       Left hemicolon diverticulosis without evidence of diverticulitis.       Prostatomegaly.        Problem List  Patient Active Problem List   Diagnosis    Essential hypertension    Hyperlipemia    DM (diabetes mellitus) ([address]    Left inguinal hernia  S/P left inguinal hernia repair    Abdominal pain    Colonic mass    Paroxysmal atrial fibrillation (HCC)    Anemia    CKD (chronic kidney disease)    Malignant neoplasm of colon (HCC)       IMPRESSION/RECOMMENDATIONS:    Stage IV Colon Ca with lung and liver mets- stable on imaging this admit. Okay to resume Stivarga chemo pill on dc. He has supply at home. Hold while inpatient since admission does not appear to be long. Possible dc today. Follow up with Dr. Mary Marx tomorrow in the office if discharged. Okay to dc from heme onc point of view if cleared by renal team and IM. Anemia- related to chemo/malignancy. Transfuse to keep Hgb over 7. B12 and folate are normal. Iron stores are low. Will order iron infusion for the office.   - can start Venofer today and finish in the office. PAYTON  - improved with creat now 2.2 (was 2.6 on 1/3/21)   - pre renal and improved with hydration     Anemia  - multifactorial and chronic   - iron started     Elevated bilirubin  - Bili is 3. Holding Lipitor and holding Stivarga chemo pill. Total bili was 4.8 on 12/22 in office   - repeat liver panel ordered for today   - check hemolysis labs   - no gross abnormality seen on CT 1/4; liver mets appear stable from prior CT (11/24/20)- Stivarga just started 11/25/20     Atrial fibb- on Xarelto. Check stool for occult blood loss. If liver fxn worsens, may need to change AC to Warfarin as NOAC cannot be used with severe liver impairment. Liver fxn stable for now and dose reduced to 15 mg PO daily     BLE edema  - chronic per patient   - secondary to CKD stage 3 and low protein stores/malnutrition     Dispo: possible dc today or tomorrow. Thank you for asking me to see the patient.        Jet Becker CNP  [facility]   Please Contact Through [employer]

## 2021-01-05 NOTE — PROGRESS NOTES
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  Daily Treatment Note  NAME: Weston Sierra  : 1955  MRN: [id number]    Date of Service: 2021    Discharge Recommendations:  24 hour supervision or assist, Home with Home health OT       Assessment   Performance deficits / Impairments: Decreased functional mobility ; Decreased ADL status; Decreased balance;Decreased endurance  Assessment: pt fatigues easily , requiring increased assist with bed mobility sit-->supine; continues to benefit from skilled OT services  REQUIRES OT FOLLOW UP: Yes  Activity Tolerance  Activity Tolerance: Patient limited by fatigue  Safety Devices  Type of devices: Call light within reach; Left in bed         Patient Diagnosis(es): The primary encounter diagnosis was Anemia, unspecified type. Diagnoses of Metastatic colon cancer to liver ([facility] [address] Decreased urination, and Chronic kidney disease, unspecified CKD stage were also pertinent to this visit. has a past medical history of Atrial fibrillation ([facility] [address] CAD (coronary artery disease), Colon cancer ([facility] [address] Hyperlipidemia, Hypertension, PAF (paroxysmal atrial fibrillation) ([facility] [address] Type 2 diabetes mellitus without complication ([facility] [address] and Type II or unspecified type diabetes mellitus without mention of complication, not stated as uncontrolled. has a past surgical history that includes hernia repair (Left, 08/25/15); hemicolectomy (2017); and Abdomen surgery (2017).     Restrictions  Restrictions/Precautions  Restrictions/Precautions: General Precautions, Up as Tolerated, Fall Risk  Position Activity Restriction  Other position/activity restrictions: IV  Subjective   General  Chart Reviewed: Yes  Patient assessed for rehabilitation services?: Yes  Family / Caregiver Present: No  Referring Practitioner: N Anusionwu  Diagnosis: anemia  Subjective  Subjective: \"I am going home later today\"  General Comment

## 2021-01-05 NOTE — CARE COORDINATION
Writer called and spoke with pt about PT/OT recommendations from yesterday for 24 hour supervision or assist, Home with Home health PT/OT and rolling walker. Pt states he lives at home with wife wife, son and daughter in law but none of them are at home all the time. Pt wants to talk to his wife before agreeing to home care. Pt did agree to let writer get him a rolling walker prior to discharge. Writer made referral to [facility]. [facility] will continue to follow and see how pt does with therapy today.   MARIANA Farfan-RN

## 2021-01-15 NOTE — ED PROVIDER NOTES
Evaluated by [id number] [facility] Provider    [facility]  ED    CHIEF COMPLAINT  Jaundice and Abnormal Lab (outpatient labs, patient cannot recall)    HISTORY OF PRESENT ILLNESS  Sofiya Shahid is a 63 y.o. male who presents to the ED complaining of weakness. Weakness for a couple days. Having some lower back pain. He had blood work drawn earlier today. He sees Dr. Anel Springer, thinks it was him or someone in the office. He sees them for colon cancer. Patient reports weakness for 2-3 days. He had talked to the office about the weakness and thinks this is why the labs were obtained. He states his wife has said he looks yellow over the past couple days. Patient has not looked at himself recently. He is on oral chemo, last IV chemo was about 1 month ago. Patient denies abdominal pain, CP or SOB. History of a fib, on Xarelto. The patient is currently rating their pain as 8/10 and describes it as an aching type of pain. Treatments tried prior to arrival in the ED: none. The patient denies other complaints, modifying factors or associated symptoms. The patient arrived to the ED via private car.     PAST MEDICAL HISTORY    Past Medical History:   Diagnosis Date    Atrial fibrillation ([address]     CAD (coronary artery disease)     Hx of A fib    Colon cancer ([address] 09/2017    stage 4     Hyperlipidemia     Hypertension     PAF (paroxysmal atrial fibrillation) (HCC)     Type 2 diabetes mellitus without complication ([address]     Type II or unspecified type diabetes mellitus without mention of complication, not stated as uncontrolled        SURGICAL HISTORY    Past Surgical History:   Procedure Laterality Date    ABDOMEN SURGERY  09/2017    Hemicolectomy    HEMICOLECTOMY  09/24/2017    RIGHT COLON MASS WITH SMALL BOWEL OBSTRUCTION              HERNIA REPAIR Left 08/25/15    open left inguinal hernia repair with mesh       CURRENT MEDICATIONS    Current Outpatient Rx   Medication Sig Dispense Refill  rivaroxaban (XARELTO) 15 MG TABS tablet Take 1 tablet by mouth daily 42 tablet 1    metoprolol tartrate (LOPRESSOR) 100 MG tablet Take 1 tablet by mouth 2 times daily 60 tablet 3    pantoprazole sodium (PROTONIX) 40 MG PACK packet Take 40 mg by mouth every morning (before breakfast)      melatonin 3 MG TABS tablet Take 10 mg by mouth nightly as needed      Multiple Vitamins-Minerals (THERAPEUTIC MULTIVITAMIN-MINERALS) tablet Take 1 tablet by mouth daily      nystatin (MYCOSTATIN) 214142 UNIT/ML suspension Take 5 mLs by mouth 4 times daily 120 mL 1    hydrochlorothiazide (MICROZIDE) 12.5 MG capsule Take 12.5 mg by mouth daily      potassium chloride (KLOR-CON M) 20 MEQ TBCR extended release tablet Take 40 mEq by mouth daily (with breakfast)       Ondansetron HCl (ZOFRAN PO) Take by mouth         ALLERGIES    No Known Allergies    FAMILY HISTORY    Family History   Problem Relation Age of Onset    Diabetes Father     High Blood Pressure Father     High Cholesterol Father     High Cholesterol Mother     High Blood Pressure Mother     Cancer Sister         site unknown       SOCIAL HISTORY    Social History     Socioeconomic History    Marital status:      Spouse name: Deshawn Walker Number of children: 2    Years of education: None    Highest education level: None   Occupational History    Occupation: maintenance     Employer: [employer]   Social Needs    Financial resource strain: None    Food insecurity     Worry: None     Inability: None    Transportation needs     Medical: None     Non-medical: None   Tobacco Use    Smoking status: Never Smoker    Smokeless tobacco: Never Used   Substance and Sexual Activity    Alcohol use: No     Alcohol/week: 0.0 standard drinks    Drug use: Yes     Frequency: 1.0 times per week     Types: Marijuana     Comment: 1-2x per week    Sexual activity: Yes     Partners: Female   Lifestyle    Physical activity     Days per week: None Minutes per session: None    Stress: None   Relationships    Social connections     Talks on phone: None     Gets together: None     Attends Synagogue service: None     Active member of club or organization: None     Attends meetings of clubs or organizations: None     Relationship status: None    Intimate partner violence     Fear of current or ex partner: None     Emotionally abused: None     Physically abused: None     Forced sexual activity: None   Other Topics Concern    None   Social History Narrative    None       REVIEW OFSYSTEMS    10systems reviewed, pertinent positives per HPI otherwise noted to be negative. PHYSICAL EXAM  Physical Exam  Vitals:    01/15/21 2042   BP: 137/81   Pulse: 75   Resp: 16   Temp:    SpO2: 96%     GENERAL: Patient is thin, chronically ill-appearing, jaundiced. Awake andalert. Cooperative. Resting in bed. No apparent distress. HEENT:  Normocephalic, atraumatic. Conjunctivaappear normal.  Scleral icterus is present. External ears are normal.    NECK: Supple with normal ROM. Tracheamidline  LUNGS: Equal and symmetric chest rise. Breathing is unlabored. Speaking comfortably in fullsentences. Lungs are clear bilaterally to auscultation. Without wheezing, rales, or rhonchi. CADIOVASCULAR:  Regular rate and rhythm. Normal S1-S2 sounds. No murmurs, rubs, or gallops. GI: Soft, non-tender to palpation, nondistended with positive bowel sounds. No rebound tenderness, guarding or rigidity. Negative Salinas's sign. Negative Rovsing's sign. No CVAT to palpation. No masses or hepatosplenomegaly to palpation. MUSCULOSKELETAL:  No gross deformities or trauma noted. Moving all extremities equally and appropriately. Normal ROM. SKIN: Warm/dry. Skin is intact. Skin is jaundiced in appearance. PSYCHIATRIC: Mood and affect appropriate. Speech is clear and articulate. NEUROLOGIC: Alert and oriented. No focal motor or sensory deficits.      LABS Results for orders placed or performed during the hospital encounter of 01/15/21   CBC auto differential   Result Value Ref Range    WBC 17.5 (H) 4.0 - 11.0 K/uL    RBC 3.42 (L) 4.20 - 5.90 M/uL    Hemoglobin 9.6 (L) 13.5 - 17.5 g/dL    Hematocrit 31.2 (L) 40.5 - 52.5 %    MCV 91.2 80.0 - 100.0 fL    MCH 28.0 26.0 - 34.0 pg    MCHC 30.6 (L) 31.0 - 36.0 g/dL    RDW 19.8 (H) 12.4 - 15.4 %    Platelets 518 339 - 707 K/uL    MPV 7.6 5.0 - 10.5 fL    Neutrophils % 87.4 %    Lymphocytes % 2.4 %    Monocytes % 9.0 %    Eosinophils % 0.8 %    Basophils % 0.4 %    Neutrophils Absolute 15.3 (H) 1.7 - 7.7 K/uL    Lymphocytes Absolute 0.4 (L) 1.0 - 5.1 K/uL    Monocytes Absolute 1.6 (H) 0.0 - 1.3 K/uL    Eosinophils Absolute 0.1 0.0 - 0.6 K/uL    Basophils Absolute 0.1 0.0 - 0.2 K/uL   Comprehensive metabolic panel   Result Value Ref Range    Sodium 131 (L) 136 - 145 mmol/L    Potassium 4.7 3.5 - 5.1 mmol/L    Chloride 100 99 - 110 mmol/L    CO2 19 (L) 21 - 32 mmol/L    Anion Gap 12 3 - 16    Glucose 140 (H) 70 - 99 mg/dL    BUN 63 (H) 7 - 20 mg/dL    CREATININE 2.5 (H) 0.8 - 1.3 mg/dL    GFR Non-African American 26 (A) >60    GFR  32 (A) >60    Calcium 8.3 8.3 - 10.6 mg/dL    Total Protein 6.7 6.4 - 8.2 g/dL    Alb 2.3 (L) 3.4 - 5.0 g/dL    Albumin/Globulin Ratio 0.5 (L) 1.1 - 2.2    Total Bilirubin 5.0 (H) 0.0 - 1.0 mg/dL    Alkaline Phosphatase 433 (H) 40 - 129 U/L    ALT 46 (H) 10 - 40 U/L    AST 46 (H) 15 - 37 U/L    Globulin 4.4 g/dL   Ammonia   Result Value Ref Range    Ammonia <10 (L) 16 - 60 umol/L   Lactic acid, plasma   Result Value Ref Range    Lactic Acid 2.9 (H) 0.4 - 2.0 mmol/L   Sample possible blood bank testing   Result Value Ref Range    Specimen Status ARACELI    Protime-INR   Result Value Ref Range    Protime 39.8 (H) 10.0 - 13.2 sec    INR 3.39 (H) 0.86 - 1.14   APTT   Result Value Ref Range    aPTT 38.7 (H) 24.2 - 36.2 sec   Lipase   Result Value Ref Range    Lipase 11.0 (L) 13.0 - 60.0 U/L Urinalysis, reflex to microscopic   Result Value Ref Range    Color, UA Yellow Straw/Yellow    Clarity, UA SL CLOUDY (A) Clear    Glucose, Ur Negative Negative mg/dL    Bilirubin Urine SMALL (A) Negative    Ketones, Urine Negative Negative mg/dL    Specific Gravity, UA 1.025 1.005 - 1.030    Blood, Urine Negative Negative    pH, UA 5.0 5.0 - 8.0    Protein, UA 30 (A) Negative mg/dL    Urobilinogen, Urine 2.0 (A) <2.0 E.U./dL    Nitrite, Urine Negative Negative    Leukocyte Esterase, Urine Negative Negative    Microscopic Examination YES     Urine Type NotGiven    Microscopic Urinalysis   Result Value Ref Range    Coarse Casts, UA 6-10 (A) 0 - 2 /LPF    Mucus, UA Rare (A) None Seen /LPF    WBC, UA 0-2 0 - 5 /HPF    RBC, UA 0-2 0 - 4 /HPF    Epithelial Cells, UA 2-5 0 - 5 /HPF    Bacteria, UA Rare (A) None Seen /HPF    Amorphous, UA Rare /HPF   Lactic acid, plasma   Result Value Ref Range    Lactic Acid 1.3 0.4 - 2.0 mmol/L       RADIOLOGY    Ct Abdomen Pelvis Wo Contrast Additional Contrast? None    Result Date: 1/15/2021 hypertrophy left adrenal gland unchanged. GI/Bowel: Sutures are seen right colon anastomotic site. There is no obstruction. Scattered diverticuli present. There is some limitation without oral or intravenous contrast.  Moderate ascites is present. Pelvis: Urinary bladder is unremarkable. Moderate pelvic and intra-abdominal fluid present. Mild prostatic enlargement present. Peritoneum/Retroperitoneum: Moderate ascites is seen throughout the abdomen. Infiltration within the omentum could represent omental tumor infiltration as was defined previously. This is unchanged as well. Vasculature: Unremarkable with noncontrast imaging. Soft Tissues/Bones: Midline ventral hernia is present, containing ascites. Moderate 3rd spacing of fluid is seen throughout the subcutaneous fat increased moderately from the prior study. No destructive bone lesion seen. Extensive metastatic disease throughout the liver with calcification frequently seen with metastatic colon carcinoma. Lesions are grossly unchanged. However there is suspected subtle asymmetric dilation intrahepatic left bile ducts, more likely due to central obstruction given the extensive tumor involvement. Extensive metastatic disease throughout the lung bases also unchanged. Moderate ascites likely malignant in nature with possible omental tumor infiltration unchanged. Increased 3rd spacing of fluid throughout the subcutaneous fat. Slight increased right pleural effusion. This is may be malignant in nature as well.      Xr Chest Portable    Result Date: 1/15/2021 EXAMINATION: ONE XRAY VIEW OF THE CHEST 1/15/2021 7:23 pm COMPARISON: Prior study(s) most recent chest CT 11/24/2020. HISTORY: ORDERING SYSTEM PROVIDED HISTORY: lung mets TECHNOLOGIST PROVIDED HISTORY: Reason for exam:->lung mets Reason for Exam: JAUNDICE; ABNORMAL LABS; COLON CANCER; LUNG METS Acuity: Acute Type of Exam: Initial Relevant Medical/Surgical History: SEE EPIC FINDINGS: Heart is normal size. Lung volume is low. No acute airspace disease. Multiple small nodules are seen bilaterally. These are better demonstrated on prior CT and are grossly unchanged. Left subclavian port catheter remains in good position. Multiple small rounded metastatic nodules bilaterally best seen on recent CT scan. No acute airspace disease. ED COURSE/MDM  Patient seen and evaluated. Old records reviewed. Diagnostic testing reviewed and results discussed. I have evaluated this patient. My supervising physician was available for consultation. Branden Suresh presented to the ED today with above noted complaints. Physical exam the patient to be jaundiced with scleral icterus. On arrival to the ED he is afebrile and hemodynamically stable. He is well saturated on room air. Patient has no reproducible abdominal tenderness to palpation. I did review prior records and saw that at his most recent admission he was documented as having mild scleral icterus. There was no documentation of jaundiced skin however. Bilirubin is elevated at 5 today, it has been elevated in the past however looks like it is only been up to 4.8 in December 2020. Consulted Heme/Onc and spoke with Dr. Shiv Murphy. He was very victorina and stating that he feels that the patient needs a hospice referral.  He did not recommend that we do anything else about the hepatic duct dilation. He did state that the patient is on a third line chemotherapy agent. He stated to me that he was not the patient's doctor he did not know if anybody has discussed hospice but that would be his advice. We discussed if the patient would need to be admitted for possible hospice referral and evaluation, I do not feel that the patient has an admittable diagnosis at this time. He recommended speaking with the patient and family and asking them what their preference was. I discussed the patient's CT scan findings with him, he seemed surprised when I told him that he did have extensive metastatic cancer disease throughout the lungs, liver, and abdomen. He states that no one has brought up hospice with him at this point. I did asked the patient if he felt like being discharged versus being admitted to the hospital.  Patient stated he would prefer to go home. I did asked the patient if it was okay to speak to his wife regarding all of this and he gave me verbal permission. I did call the patient's wife and spoke with Boris Gomez who stated: [facility] [place], he was going there for an iron infusion due to last time at hospital he was found to have low iron. Wife has been noticing his skin is really yellow. He can not get up on his on own. NP said his Hgb was low and they talked about a blood transfusion she thinks but then stated it was elevated, when I told her his bilirubin was elevated she stated that is what the NP said. NP talked with Dr. Roshni Pisano and told him to come to the ER. They were supposed to call before he came (no message regarding this seen). Wife has not been to recent appointments. When he was first diagnosed with colon cancer, found out there with spread to liver and lungs. I did discuss the CT findings at length with the wife, I asked her if there has been any talk of what they would do if the third chemo agent was not effective, wife stated they had not. I also asked the wife if there had been any discussions about hospice, she stated there has not been, I asked her if she would like to have hospice involved she stated yes. I advised her this could be done either inpatient or outpatient. I discussed with her the possibility of admitting the patient however despite the leukocytosis and lactic acidosis, as the lactic acidosis improved after fluids I did not feel that he needed to be admitted to the hospital.  I am finding no source of infection to explain the leukocytosis. Wife stated that she would prefer for him to come home and states that she does have adequate help at home in regards to his weakness. I advised the wife that I would give her names of hospice providers that she can start looking into over the weekend and encouraged her to call the oncology office on Monday to further discuss plan of care regarding the patient. Wife is in agreement. Patient was given IV fluid bolus and after this was completed a repeat lactic acid level was obtained and it decreased from 2.9-1.3. Pt was given the following medications or treatments in the ED:   Medications   0.9 % sodium chloride infusion (2,994 mLs Intravenous New Bag 1/15/21 1923)     At this point I do not feel the patient requires further work upand it is reasonable to discharge the patient. Please refer to AVS for further details regarding discharge instructions. A discussion was had with the patient regarding diagnosis, diagnostic testing results,treatment/ plan of care, and follow up. All questions were answered. Patient will follow up as directed for further evaluation/treatment. The patient was given strict return precautions as we discussed symptoms that wouldnecessitate return to the ED. Patient will return to ED for new/worsening symptoms. The patient verbalized their understanding and agreement with the above plan. Patient was sent home with a prescription for below medication/s. I did [place] patient on appropriate use of these medication. New Prescriptions    No medications on file       I estimatethere is LOW risk for ACUTE APPENDICITIS, BOWEL OBSTRUCTION, CHOLECYSTITIS, DIVERTICULITIS, INCARCERATED HERNIA, PANCREATITIS, or PERFORATED BOWEL or ULCER, thus I consider the discharge disposition reasonable. Also, thereis no evidence or peritonitis, sepsis, or toxicity. [facility] and I have discussed the diagnosis and risks, and we agree with discharging home to follow-up with their primary doctor. We also discussed returning to the EmergencyDepartment immediately if new or worsening symptoms occur. We have discussed the symptoms which are most concerning (e.g., bloody stool, fever, changing or worsening pain, vomiting) that necessitate immediate return. CLINICAL IMPRESSION    1.  Intrahepatic bile duct dilation 2. Malignant neoplasm of colon, unspecified part of colon ([facility] [address]    3. Metastatic colon cancer to liver ([facility] [address]    4. Colon cancer metastasized to lung (HCC)    5. Jaundice, non-    6. Leukocytosis, unspecified type    7. Transaminitis    8. Lactic acidosis    9. Ascites, malignant    10. Chronic anticoagulation    11. Elevated serum creatinine           Discharge Vitals:  Blood pressure 137/81, pulse 75, temperature 97.2 °F (36.2 °C), temperature source Temporal, resp. rate 16, height 5' 10\" (1.778 m), weight 220 lb (99.8 kg), SpO2 96 %. FOLLOW UP  Chevy Pham DO  [address]  [address] [facility]  [phone]    Call in 3 days  For further evaluation    Danny Schmidt MD  [facility]  [address] [facility] [address] [facility] [address]  [phone]    Call in 3 days  For further evaluation    [facility]  ED  [facility]  [address] [phone]  Go to   If symptoms worsen      DISPOSITION  Patient was discharged to home in good condition. Comment: Pleasenote this report has been produced using speech recognition software and may contain errors related to that system including errors in grammar, punctuation, and spelling, as well as words and phrases that may beinappropriate. If there are any questions or concerns please feel free to contact the dictating provider for clarification.           ENDY Marie - CNP  01/15/21 5899

## 2021-01-16 NOTE — ED NOTES
Ps hem @ 2049   Re: Jaundice, possible intrahepatic duct obstruction  Dr. Allen Ta @ [facility]  01/15/21 2050

## 2021-01-19 NOTE — TELEPHONE ENCOUNTER
Spoke to Yareli Benton earlier this afternoon. Offered my assistance as needed. She will keep me advised.

## 2021-01-21 NOTE — TELEPHONE ENCOUNTER
Patient has been placed in hospice care, for comfort Care patient wife  would like to advise you of that .

## 2022-08-24 RX ORDER — IMIPRAMINE HYDROCHLORIDE 10 MG/1
TABLET, FILM COATED ORAL
Qty: 90 TABLET | Refills: 3 | OUTPATIENT
Start: 2022-08-24